# Patient Record
Sex: FEMALE | Race: BLACK OR AFRICAN AMERICAN | NOT HISPANIC OR LATINO | URBAN - METROPOLITAN AREA
[De-identification: names, ages, dates, MRNs, and addresses within clinical notes are randomized per-mention and may not be internally consistent; named-entity substitution may affect disease eponyms.]

---

## 2020-09-26 ENCOUNTER — EMERGENCY (EMERGENCY)
Facility: HOSPITAL | Age: 80
LOS: 0 days | Discharge: HOME | End: 2020-09-26
Attending: EMERGENCY MEDICINE
Payer: MEDICAID

## 2020-09-26 VITALS
TEMPERATURE: 98 F | HEART RATE: 71 BPM | OXYGEN SATURATION: 100 % | RESPIRATION RATE: 17 BRPM | SYSTOLIC BLOOD PRESSURE: 127 MMHG | DIASTOLIC BLOOD PRESSURE: 78 MMHG

## 2020-09-26 VITALS
TEMPERATURE: 97 F | HEART RATE: 74 BPM | DIASTOLIC BLOOD PRESSURE: 80 MMHG | RESPIRATION RATE: 18 BRPM | SYSTOLIC BLOOD PRESSURE: 132 MMHG | OXYGEN SATURATION: 96 % | WEIGHT: 134.92 LBS | HEIGHT: 58 IN

## 2020-09-26 DIAGNOSIS — I10 ESSENTIAL (PRIMARY) HYPERTENSION: ICD-10-CM

## 2020-09-26 DIAGNOSIS — R60.9 EDEMA, UNSPECIFIED: ICD-10-CM

## 2020-09-26 DIAGNOSIS — R60.0 LOCALIZED EDEMA: ICD-10-CM

## 2020-09-26 LAB
ALBUMIN SERPL ELPH-MCNC: 4.3 G/DL — SIGNIFICANT CHANGE UP (ref 3.3–5)
ALP SERPL-CCNC: 74 U/L — SIGNIFICANT CHANGE UP (ref 40–120)
ALT FLD-CCNC: 22 U/L — SIGNIFICANT CHANGE UP (ref 12–78)
ANION GAP SERPL CALC-SCNC: 4 MMOL/L — LOW (ref 5–17)
APTT BLD: 27.5 SEC — SIGNIFICANT CHANGE UP (ref 27.5–35.5)
AST SERPL-CCNC: 33 U/L — SIGNIFICANT CHANGE UP (ref 15–37)
BASOPHILS # BLD AUTO: 0.03 K/UL — SIGNIFICANT CHANGE UP (ref 0–0.2)
BASOPHILS NFR BLD AUTO: 0.6 % — SIGNIFICANT CHANGE UP (ref 0–2)
BILIRUB SERPL-MCNC: 0.6 MG/DL — SIGNIFICANT CHANGE UP (ref 0.2–1.2)
BUN SERPL-MCNC: 8 MG/DL — SIGNIFICANT CHANGE UP (ref 7–23)
CALCIUM SERPL-MCNC: 9.8 MG/DL — SIGNIFICANT CHANGE UP (ref 8.5–10.1)
CHLORIDE SERPL-SCNC: 108 MMOL/L — SIGNIFICANT CHANGE UP (ref 96–108)
CO2 SERPL-SCNC: 30 MMOL/L — SIGNIFICANT CHANGE UP (ref 22–31)
CREAT SERPL-MCNC: 0.76 MG/DL — SIGNIFICANT CHANGE UP (ref 0.5–1.3)
EOSINOPHIL # BLD AUTO: 0.01 K/UL — SIGNIFICANT CHANGE UP (ref 0–0.5)
EOSINOPHIL NFR BLD AUTO: 0.2 % — SIGNIFICANT CHANGE UP (ref 0–6)
GLUCOSE SERPL-MCNC: 93 MG/DL — SIGNIFICANT CHANGE UP (ref 70–99)
HCT VFR BLD CALC: 31.4 % — LOW (ref 34.5–45)
HGB BLD-MCNC: 10.3 G/DL — LOW (ref 11.5–15.5)
IMM GRANULOCYTES NFR BLD AUTO: 0.4 % — SIGNIFICANT CHANGE UP (ref 0–1.5)
INR BLD: 1.06 RATIO — SIGNIFICANT CHANGE UP (ref 0.88–1.16)
LYMPHOCYTES # BLD AUTO: 1.94 K/UL — SIGNIFICANT CHANGE UP (ref 1–3.3)
LYMPHOCYTES # BLD AUTO: 35.6 % — SIGNIFICANT CHANGE UP (ref 13–44)
MCHC RBC-ENTMCNC: 30.6 PG — SIGNIFICANT CHANGE UP (ref 27–34)
MCHC RBC-ENTMCNC: 32.8 GM/DL — SIGNIFICANT CHANGE UP (ref 32–36)
MCV RBC AUTO: 93.2 FL — SIGNIFICANT CHANGE UP (ref 80–100)
MONOCYTES # BLD AUTO: 0.4 K/UL — SIGNIFICANT CHANGE UP (ref 0–0.9)
MONOCYTES NFR BLD AUTO: 7.3 % — SIGNIFICANT CHANGE UP (ref 2–14)
NEUTROPHILS # BLD AUTO: 3.05 K/UL — SIGNIFICANT CHANGE UP (ref 1.8–7.4)
NEUTROPHILS NFR BLD AUTO: 55.9 % — SIGNIFICANT CHANGE UP (ref 43–77)
NRBC # BLD: 0 /100 WBCS — SIGNIFICANT CHANGE UP (ref 0–0)
NT-PROBNP SERPL-SCNC: 47 PG/ML — SIGNIFICANT CHANGE UP (ref 0–450)
PLATELET # BLD AUTO: 159 K/UL — SIGNIFICANT CHANGE UP (ref 150–400)
POTASSIUM SERPL-MCNC: 4 MMOL/L — SIGNIFICANT CHANGE UP (ref 3.5–5.3)
POTASSIUM SERPL-SCNC: 4 MMOL/L — SIGNIFICANT CHANGE UP (ref 3.5–5.3)
PROT SERPL-MCNC: 8.5 GM/DL — HIGH (ref 6–8.3)
PROTHROM AB SERPL-ACNC: 12.3 SEC — SIGNIFICANT CHANGE UP (ref 10.6–13.6)
RBC # BLD: 3.37 M/UL — LOW (ref 3.8–5.2)
RBC # FLD: 12.6 % — SIGNIFICANT CHANGE UP (ref 10.3–14.5)
SODIUM SERPL-SCNC: 142 MMOL/L — SIGNIFICANT CHANGE UP (ref 135–145)
WBC # BLD: 5.45 K/UL — SIGNIFICANT CHANGE UP (ref 3.8–10.5)
WBC # FLD AUTO: 5.45 K/UL — SIGNIFICANT CHANGE UP (ref 3.8–10.5)

## 2020-09-26 PROCEDURE — 99285 EMERGENCY DEPT VISIT HI MDM: CPT

## 2020-09-26 PROCEDURE — 93970 EXTREMITY STUDY: CPT | Mod: 26

## 2020-09-26 NOTE — ED ADULT NURSE NOTE - NSFALLRSKOUTCOME_ED_ALL_ED
Bed: P6  Expected date: 7/13/19  Expected time: 5:40 PM  Means of arrival: [facility] (127)  Comments:  34 yr F Right sided abdominal pain. Onset: End of June.  Was diagnosed with gallstones.  Apt with MD on Monday but unable to wait.      172/106 95 16R 99%RA   Universal Safety Interventions

## 2020-09-26 NOTE — ED PROVIDER NOTE - PHYSICAL EXAMINATION
Gen: Alert, Well appearing. NAD    Head: NC, AT, PERRL, normal lids/conjunctiva   ENT: Bilateral TM WNL, patent oropharynx without erythema/exudate, uvula midline  Neck: supple, no tenderness/meningismus  Pulm: Bilateral clear BS, normal resp effort  CV: RRR, no M/R/G, +dist pulses   Abd: soft, NT/ND, +BS, no guarding/rebound tenderness  Mskel: ++edema to left knee, mild edema to rt leg. no tenderness, erythema  Skin: no rash, no bruising  Neuro: AAOx3, no sensory/motor deficits, CN 2-12 intact

## 2020-09-26 NOTE — ED PROVIDER NOTE - NSFOLLOWUPINSTRUCTIONS_ED_ALL_ED_FT
Follow up with your primary care doctor within the next 24-48 hours and bring copy of your results.  Return to the Emergency Department for worsening or persistent symptoms or any other concerns incl. chest pain, shortness of breath, dizziness, inability to tolerate oral intake.  Rest, drink plenty of fluids.  Advance activity as tolerated.  Continue all previously prescribed medications as directed.     Elevate your legs as often as possible.

## 2020-09-26 NOTE — ED ADULT NURSE NOTE - OBJECTIVE STATEMENT
81 y/o female with PMH of HTN & Arthritis. Presents to the ED with c/c of right lower extremity swelling. pt has bilateral leg swelling and the right leg has gotten worst over the past couple days. There's pitting edema with 1+pulse on the dorsal right foot & intermittent pain upon palpitation.

## 2020-09-26 NOTE — ED PROVIDER NOTE - PATIENT PORTAL LINK FT
You can access the FollowMyHealth Patient Portal offered by [facility] by registering at the following website: [email or web address]. By joining [email or web address] FollowMyHealth portal, you will also be able to view your health information using other applications (apps) compatible with our system.

## 2020-09-26 NOTE — ED PROVIDER NOTE - OBJECTIVE STATEMENT
79yo female with pmh HTN presents with LE edema for a while, but now left more so in past few days. denies cp, sob, dizziness, palpitations. seen by PMD yesterday told to go to ER for sono yesterday. denies h/o dvt/pe, gi bleed, frequent falls.    No fever/chills, No photophobia/eye pain/changes in vision, No ear pain/sore throat/dysphagia, No chest pain/palpitations, no SOB/cough, no wheeze/stridor, No abdominal pain, No N/V/D, no dysuria/frequency/discharge, No neck/back pain, + LE edema, no changes in neurological status/function.

## 2021-03-25 ENCOUNTER — INPATIENT (INPATIENT)
Facility: HOSPITAL | Age: 81
LOS: 11 days | Discharge: HOME | End: 2021-04-06
Attending: STUDENT IN AN ORGANIZED HEALTH CARE EDUCATION/TRAINING PROGRAM | Admitting: STUDENT IN AN ORGANIZED HEALTH CARE EDUCATION/TRAINING PROGRAM
Payer: MEDICAID

## 2021-03-25 VITALS
HEART RATE: 93 BPM | HEIGHT: 58 IN | DIASTOLIC BLOOD PRESSURE: 61 MMHG | RESPIRATION RATE: 21 BRPM | TEMPERATURE: 100 F | WEIGHT: 179.9 LBS | SYSTOLIC BLOOD PRESSURE: 110 MMHG | OXYGEN SATURATION: 93 %

## 2021-03-25 LAB
ALBUMIN SERPL ELPH-MCNC: 2.8 G/DL — LOW (ref 3.3–5)
ALBUMIN SERPL ELPH-MCNC: 3.1 G/DL — LOW (ref 3.3–5)
ALP SERPL-CCNC: 46 U/L — SIGNIFICANT CHANGE UP (ref 40–120)
ALP SERPL-CCNC: 48 U/L — SIGNIFICANT CHANGE UP (ref 40–120)
ALT FLD-CCNC: 47 U/L — SIGNIFICANT CHANGE UP (ref 12–78)
ALT FLD-CCNC: 55 U/L — SIGNIFICANT CHANGE UP (ref 12–78)
ANION GAP SERPL CALC-SCNC: 9 MMOL/L — SIGNIFICANT CHANGE UP (ref 5–17)
APPEARANCE UR: CLEAR — SIGNIFICANT CHANGE UP
APTT BLD: 25.8 SEC — LOW (ref 27.5–35.5)
AST SERPL-CCNC: 79 U/L — HIGH (ref 15–37)
AST SERPL-CCNC: 83 U/L — HIGH (ref 15–37)
BACTERIA # UR AUTO: ABNORMAL
BASOPHILS # BLD AUTO: 0.03 K/UL — SIGNIFICANT CHANGE UP (ref 0–0.2)
BASOPHILS NFR BLD AUTO: 0.3 % — SIGNIFICANT CHANGE UP (ref 0–2)
BILIRUB DIRECT SERPL-MCNC: 0.22 MG/DL — HIGH (ref 0.05–0.2)
BILIRUB INDIRECT FLD-MCNC: 0.5 MG/DL — SIGNIFICANT CHANGE UP (ref 0.2–1)
BILIRUB SERPL-MCNC: 0.7 MG/DL — SIGNIFICANT CHANGE UP (ref 0.2–1.2)
BILIRUB SERPL-MCNC: 0.7 MG/DL — SIGNIFICANT CHANGE UP (ref 0.2–1.2)
BILIRUB UR-MCNC: NEGATIVE — SIGNIFICANT CHANGE UP
BUN SERPL-MCNC: 12 MG/DL — SIGNIFICANT CHANGE UP (ref 7–23)
CALCIUM SERPL-MCNC: 8.8 MG/DL — SIGNIFICANT CHANGE UP (ref 8.5–10.1)
CHLORIDE SERPL-SCNC: 103 MMOL/L — SIGNIFICANT CHANGE UP (ref 96–108)
CK SERPL-CCNC: 104 U/L — SIGNIFICANT CHANGE UP (ref 26–192)
CO2 SERPL-SCNC: 27 MMOL/L — SIGNIFICANT CHANGE UP (ref 22–31)
COLOR SPEC: YELLOW — SIGNIFICANT CHANGE UP
CREAT SERPL-MCNC: 0.71 MG/DL — SIGNIFICANT CHANGE UP (ref 0.5–1.3)
CREAT SERPL-MCNC: 0.98 MG/DL — SIGNIFICANT CHANGE UP (ref 0.5–1.3)
D DIMER BLD IA.RAPID-MCNC: HIGH NG/ML DDU
DIFF PNL FLD: ABNORMAL
EOSINOPHIL # BLD AUTO: 0 K/UL — SIGNIFICANT CHANGE UP (ref 0–0.5)
EOSINOPHIL NFR BLD AUTO: 0 % — SIGNIFICANT CHANGE UP (ref 0–6)
EPI CELLS # UR: SIGNIFICANT CHANGE UP
FIBRINOGEN PPP-MCNC: 720 MG/DL — HIGH (ref 290–520)
FLUAV AG NPH QL: SIGNIFICANT CHANGE UP
FLUBV AG NPH QL: SIGNIFICANT CHANGE UP
GLUCOSE SERPL-MCNC: 129 MG/DL — HIGH (ref 70–99)
GLUCOSE UR QL: NEGATIVE MG/DL — SIGNIFICANT CHANGE UP
HCT VFR BLD CALC: 33.5 % — LOW (ref 34.5–45)
HGB BLD-MCNC: 11 G/DL — LOW (ref 11.5–15.5)
IMM GRANULOCYTES NFR BLD AUTO: 1.2 % — SIGNIFICANT CHANGE UP (ref 0–1.5)
INR BLD: 1.36 RATIO — HIGH (ref 0.88–1.16)
INR BLD: 1.43 RATIO — HIGH (ref 0.88–1.16)
KETONES UR-MCNC: NEGATIVE — SIGNIFICANT CHANGE UP
LACTATE SERPL-SCNC: 2.3 MMOL/L — HIGH (ref 0.7–2)
LEUKOCYTE ESTERASE UR-ACNC: ABNORMAL
LYMPHOCYTES # BLD AUTO: 0.83 K/UL — LOW (ref 1–3.3)
LYMPHOCYTES # BLD AUTO: 8 % — LOW (ref 13–44)
MCHC RBC-ENTMCNC: 30.1 PG — SIGNIFICANT CHANGE UP (ref 27–34)
MCHC RBC-ENTMCNC: 32.8 GM/DL — SIGNIFICANT CHANGE UP (ref 32–36)
MCV RBC AUTO: 91.5 FL — SIGNIFICANT CHANGE UP (ref 80–100)
MONOCYTES # BLD AUTO: 0.64 K/UL — SIGNIFICANT CHANGE UP (ref 0–0.9)
MONOCYTES NFR BLD AUTO: 6.2 % — SIGNIFICANT CHANGE UP (ref 2–14)
NEUTROPHILS # BLD AUTO: 8.72 K/UL — HIGH (ref 1.8–7.4)
NEUTROPHILS NFR BLD AUTO: 84.3 % — HIGH (ref 43–77)
NITRITE UR-MCNC: NEGATIVE — SIGNIFICANT CHANGE UP
NRBC # BLD: 0 /100 WBCS — SIGNIFICANT CHANGE UP (ref 0–0)
PH UR: 5 — SIGNIFICANT CHANGE UP (ref 5–8)
PLATELET # BLD AUTO: 197 K/UL — SIGNIFICANT CHANGE UP (ref 150–400)
POTASSIUM SERPL-MCNC: 3.7 MMOL/L — SIGNIFICANT CHANGE UP (ref 3.5–5.3)
POTASSIUM SERPL-SCNC: 3.7 MMOL/L — SIGNIFICANT CHANGE UP (ref 3.5–5.3)
PROT SERPL-MCNC: 6.6 GM/DL — SIGNIFICANT CHANGE UP (ref 6–8.3)
PROT SERPL-MCNC: 7.3 GM/DL — SIGNIFICANT CHANGE UP (ref 6–8.3)
PROT UR-MCNC: 100 MG/DL
PROTHROM AB SERPL-ACNC: 15.5 SEC — HIGH (ref 10.6–13.6)
PROTHROM AB SERPL-ACNC: 16.3 SEC — HIGH (ref 10.6–13.6)
RBC # BLD: 3.66 M/UL — LOW (ref 3.8–5.2)
RBC # FLD: 12.3 % — SIGNIFICANT CHANGE UP (ref 10.3–14.5)
RBC CASTS # UR COMP ASSIST: SIGNIFICANT CHANGE UP /HPF (ref 0–4)
SARS-COV-2 RNA SPEC QL NAA+PROBE: DETECTED
SODIUM SERPL-SCNC: 139 MMOL/L — SIGNIFICANT CHANGE UP (ref 135–145)
SP GR SPEC: 1.01 — SIGNIFICANT CHANGE UP (ref 1.01–1.02)
TROPONIN I SERPL-MCNC: 0.09 NG/ML — HIGH (ref 0.01–0.04)
TROPONIN I SERPL-MCNC: 0.11 NG/ML — HIGH (ref 0.01–0.04)
UROBILINOGEN FLD QL: NEGATIVE MG/DL — SIGNIFICANT CHANGE UP
WBC # BLD: 10.34 K/UL — SIGNIFICANT CHANGE UP (ref 3.8–10.5)
WBC # FLD AUTO: 10.34 K/UL — SIGNIFICANT CHANGE UP (ref 3.8–10.5)
WBC UR QL: SIGNIFICANT CHANGE UP

## 2021-03-25 PROCEDURE — 99285 EMERGENCY DEPT VISIT HI MDM: CPT

## 2021-03-25 PROCEDURE — 99223 1ST HOSP IP/OBS HIGH 75: CPT

## 2021-03-25 PROCEDURE — 71045 X-RAY EXAM CHEST 1 VIEW: CPT | Mod: 26

## 2021-03-25 PROCEDURE — 99497 ADVNCD CARE PLAN 30 MIN: CPT | Mod: 25

## 2021-03-25 PROCEDURE — 71275 CT ANGIOGRAPHY CHEST: CPT | Mod: 26

## 2021-03-25 PROCEDURE — 93010 ELECTROCARDIOGRAM REPORT: CPT

## 2021-03-25 RX ORDER — ACETAMINOPHEN 500 MG
650 TABLET ORAL ONCE
Refills: 0 | Status: COMPLETED | OUTPATIENT
Start: 2021-03-25 | End: 2021-03-25

## 2021-03-25 RX ORDER — ASCORBIC ACID 60 MG
1 TABLET,CHEWABLE ORAL
Qty: 0 | Refills: 0 | DISCHARGE

## 2021-03-25 RX ORDER — SODIUM CHLORIDE 9 MG/ML
1000 INJECTION INTRAMUSCULAR; INTRAVENOUS; SUBCUTANEOUS ONCE
Refills: 0 | Status: COMPLETED | OUTPATIENT
Start: 2021-03-25 | End: 2021-03-25

## 2021-03-25 RX ORDER — BENZOYL PEROXIDE MICRONIZED 5.8 %
1 TOWELETTE (EA) TOPICAL
Qty: 0 | Refills: 0 | DISCHARGE

## 2021-03-25 RX ORDER — TRIAMTERENE/HYDROCHLOROTHIAZID 75 MG-50MG
1 TABLET ORAL
Qty: 0 | Refills: 0 | DISCHARGE

## 2021-03-25 RX ORDER — REMDESIVIR 5 MG/ML
100 INJECTION INTRAVENOUS EVERY 24 HOURS
Refills: 0 | Status: COMPLETED | OUTPATIENT
Start: 2021-03-26 | End: 2021-03-29

## 2021-03-25 RX ORDER — ENOXAPARIN SODIUM 100 MG/ML
80 INJECTION SUBCUTANEOUS EVERY 12 HOURS
Refills: 0 | Status: DISCONTINUED | OUTPATIENT
Start: 2021-03-25 | End: 2021-03-27

## 2021-03-25 RX ORDER — ENOXAPARIN SODIUM 100 MG/ML
40 INJECTION SUBCUTANEOUS ONCE
Refills: 0 | Status: COMPLETED | OUTPATIENT
Start: 2021-03-25 | End: 2021-03-25

## 2021-03-25 RX ORDER — REMDESIVIR 5 MG/ML
100 INJECTION INTRAVENOUS EVERY 24 HOURS
Refills: 0 | Status: DISCONTINUED | OUTPATIENT
Start: 2021-03-25 | End: 2021-03-25

## 2021-03-25 RX ORDER — REMDESIVIR 5 MG/ML
INJECTION INTRAVENOUS
Refills: 0 | Status: COMPLETED | OUTPATIENT
Start: 2021-03-25 | End: 2021-03-29

## 2021-03-25 RX ORDER — ASPIRIN/CALCIUM CARB/MAGNESIUM 324 MG
162 TABLET ORAL ONCE
Refills: 0 | Status: COMPLETED | OUTPATIENT
Start: 2021-03-25 | End: 2021-03-25

## 2021-03-25 RX ORDER — TOCILIZUMAB 20 MG/ML
600 INJECTION, SOLUTION, CONCENTRATE INTRAVENOUS ONCE
Refills: 0 | Status: DISCONTINUED | OUTPATIENT
Start: 2021-03-25 | End: 2021-03-26

## 2021-03-25 RX ORDER — DEXAMETHASONE 0.5 MG/5ML
6 ELIXIR ORAL ONCE
Refills: 0 | Status: COMPLETED | OUTPATIENT
Start: 2021-03-25 | End: 2021-03-25

## 2021-03-25 RX ORDER — FUROSEMIDE 40 MG
1 TABLET ORAL
Qty: 0 | Refills: 0 | DISCHARGE

## 2021-03-25 RX ORDER — REMDESIVIR 5 MG/ML
200 INJECTION INTRAVENOUS EVERY 24 HOURS
Refills: 0 | Status: COMPLETED | OUTPATIENT
Start: 2021-03-25 | End: 2021-03-25

## 2021-03-25 RX ORDER — ENOXAPARIN SODIUM 100 MG/ML
40 INJECTION SUBCUTANEOUS EVERY 12 HOURS
Refills: 0 | Status: DISCONTINUED | OUTPATIENT
Start: 2021-03-25 | End: 2021-03-25

## 2021-03-25 RX ORDER — ASCORBIC ACID 60 MG
0 TABLET,CHEWABLE ORAL
Qty: 0 | Refills: 0 | DISCHARGE

## 2021-03-25 RX ORDER — DEXAMETHASONE 0.5 MG/5ML
6 ELIXIR ORAL DAILY
Refills: 0 | Status: DISCONTINUED | OUTPATIENT
Start: 2021-03-25 | End: 2021-03-30

## 2021-03-25 RX ADMIN — REMDESIVIR 500 MILLIGRAM(S): 5 INJECTION INTRAVENOUS at 19:00

## 2021-03-25 RX ADMIN — Medication 162 MILLIGRAM(S): at 17:23

## 2021-03-25 RX ADMIN — Medication 650 MILLIGRAM(S): at 17:23

## 2021-03-25 RX ADMIN — Medication 6 MILLIGRAM(S): at 17:23

## 2021-03-25 RX ADMIN — SODIUM CHLORIDE 1000 MILLILITER(S): 9 INJECTION INTRAMUSCULAR; INTRAVENOUS; SUBCUTANEOUS at 17:22

## 2021-03-25 RX ADMIN — ENOXAPARIN SODIUM 40 MILLIGRAM(S): 100 INJECTION SUBCUTANEOUS at 18:17

## 2021-03-25 RX ADMIN — Medication 6 MILLIGRAM(S): at 18:17

## 2021-03-25 NOTE — H&P ADULT - NSHPSOCIALHISTORY_GEN_ALL_CORE
lives with Family.   No illicit drug abuse, alcohol abuse, tobacco abuse.     PSH: none    FH: no hx of CAD. PE in Family members. Grand mother has HTN

## 2021-03-25 NOTE — ED ADULT NURSE NOTE - OBJECTIVE STATEMENT
Pt received in bed alert and oriented and resting in bed with the c/o being covid + x 2 week. Pt is week and hypoxic and on 6L NC. As per Md's orders IV yuri placed and blood specimen obtained and sent to the lab. Nursing care ongoing and safety maintained. Pt maintained of contact isolation for covid 19

## 2021-03-25 NOTE — H&P ADULT - ASSESSMENT
80 year old female with PMH of arthritis, HTN and chronic leg edema was presented with worsening shortness of breath. Patient was diagnosed with acute hypoxic respiratory failure and tested positive for COVID bilateral pneumonia.     Acute hypoxic respiratory failure with bilateral COVID pneumonia. other important differentials include superimposed bacterial pneumonia, PE (given elevated d-dimers) and CHF (cardiomegaly on CXR and hx of chronic leg edema). admit to tele. start remdesivir and decadron (some effect if given within 2 weeks window). Trend Hepatic panel, creatinine and inflammatory markers. cont high flow oxygen to keep sat > 92%. I called and discussed with Dr Cabello, pharmacy and Dr Judge ([phone]) about Toci>> cont decadron today and if no improvement in respiratory status by tomorrow, should get Toci. Follow pending CTA chest to rule out or in PE. start lovenox 40 mg q12h for now.   Normocytic anemia. no active gross bleeding. watch H and H on lovenox.   Hx of HTN. controlled at this time. I will hold off home med for now and monitor. Add iv hydralazine prn.   DVT ppx: Lovenox  Full code.  please separate note for GOC.

## 2021-03-25 NOTE — GOALS OF CARE CONVERSATION - ADVANCED CARE PLANNING - CONVERSATION DETAILS
discussed about goals of care and prognosis and also about health care proxy  patient named her daughter- in -law Yunior as her HCP for medical decisions.   She wanted to be full code and wants to pursue aggressive care.  I also called and discussed the same with Edgarma on phone

## 2021-03-25 NOTE — ED PROVIDER NOTE - CARE PLAN
Principal Discharge DX:	Shortness of breath  Secondary Diagnosis:	COVID-19  Secondary Diagnosis:	Hypoxia

## 2021-03-25 NOTE — H&P ADULT - NSHPLABSRESULTS_GEN_ALL_CORE
LABS:                        11.0   10.34 )-----------( 197      ( 25 Mar 2021 16:01 )             33.5             03-25    139  |  103  |  12  ----------------------------<  129<H>  3.7   |  27  |  0.98    Ca    8.8      25 Mar 2021 16:01    TPro  7.3  /  Alb  3.1<L>  /  TBili  0.7  /  DBili  x   /  AST  83<H>  /  ALT  55  /  AlkPhos  48  03-25              PT/INR - ( 25 Mar 2021 16:01 )   PT: 15.5 sec;   INR: 1.36 ratio         PTT - ( 25 Mar 2021 16:01 )  PTT:25.8 sec     CARDIAC MARKERS ( 25 Mar 2021 16:01 )  .085 ng/mL / x     / 104 U/L / x     / x        Interval Radiology studies: reviewed by me  < from: Xray Chest 1 View- PORTABLE-Urgent (03.25.21 @ 16:28) >      IMPRESSION: Heart enlargement and bilateral infiltrates.    < end of copied text >    US legs no DVT

## 2021-03-25 NOTE — H&P ADULT - NSHPPHYSICALEXAM_GEN_ALL_CORE
Vital Signs Last 24 Hrs  T(C): 37.8 (25 Mar 2021 15:03), Max: 37.8 (25 Mar 2021 15:03)  T(F): 100.1 (25 Mar 2021 15:03), Max: 100.1 (25 Mar 2021 15:03)  HR: 85 (25 Mar 2021 16:27) (85 - 93)  BP: 110/61 (25 Mar 2021 15:03) (110/61 - 110/61)  BP(mean): --  RR: 20 (25 Mar 2021 16:27) (20 - 21)  SpO2: 95% (25 Mar 2021 16:27) (93% - 95%)    General: elderly pleasant and on high flow oxygen  HEENT: Head is atraumatic and normocephalic. Pupils are equal and reactive to light. no Conjunctival congestion. Nasal mucosa was not examined as patient has high flow oxygen on. Oral mucosa moist. No Pharyngeal congestion. EAC intact and no drainage noticed from ears  Neck: supple. no JVD. No visible Thyroid enlargement   CVS: S1 S2 normal, RRR, no murmur  Resp: No labored breathing. Decreased air entry bibasally, no wheezing, no crackles   GI: abd soft, non tender. + BS  MSK: Expected ROM in all extremities. No cyanosis. No Clubbing. 1-2 + edema legs. no calf tenderness.   Neurology: Grossly non focal.   Lymphatic: No gross LAP   Integumentary: moist, no rash   Psychiatry: Alert, awake. Oriented x 3. Appropriate mood

## 2021-03-25 NOTE — ED PROVIDER NOTE - CLINICAL SUMMARY MEDICAL DECISION MAKING FREE TEXT BOX
79 yo F presenting with respiratory distress/hypoxia 2/2 covid pna. placed on HFNC(RR improved from 40's to 20's), + trop given ASA, + ddimer- ordered CTA, tba on tele.

## 2021-03-25 NOTE — H&P ADULT - NSHPREVIEWOFSYSTEMS_GEN_ALL_CORE
Except pertinent positives and negatives as mentioned in HPI, all other review of systems including constitutional, HEENT, CVS, Resp, GI, , MSK, Integumentary, Psychiatry, Neurology, allergic, hematologic/lymphatic are reviewed and found unremarkable at the time of my evaluation.

## 2021-03-25 NOTE — ED PROVIDER NOTE - OBJECTIVE STATEMENT
81 yo F, never smoker, hx HTN, presenting with complaints of SOB in setting of one week of COVID symptoms.+ cough, body aches. Denies cp. LLE swelling which patient states is chronic.

## 2021-03-25 NOTE — H&P ADULT - HISTORY OF PRESENT ILLNESS
80 year old female with PMH of arthritis, chronic leg edema and HTN was presented from PCP office via [facility] for worsening shortness of breath.   Patient stated that 2 weeks some of her Family members were diagnosed with COVID and she was tested positive on 3/13/21. She felt tired, had headache off and on with sore throat and cough.   she denied any sob at that time. For last 3 days, she reported developing progressive sob, worsened persistently. Associated fever.   sob worse on exertion. + chronic leg edema but no calf pain or redness.   no LOC or dizziness or chest pain.   no n/v/d/active gross bleeding.   Family members have recovered from COVID per patient.   No Medication intake for COVID at home.   I called daughter in law also and she mentioned to me that patient was likely sick during this time but patient downplayed her symptoms. patient's son told her to go to PCP otherwise he will call [facility] to take her to the hospital. Patient went to see her PCP and directed via [facility] to [facility].   in the ER, patient was found to have acute hypoxic respiratory failure and was placed on high flow oxygen.   CXR showed bilateral pneumonia consistent with COVID.   DVT was ruled out US doppler legs.   Hospitalist service was consulted for further management.

## 2021-03-25 NOTE — ED ADULT NURSE NOTE - NSIMPLEMENTINTERV_GEN_ALL_ED
Implemented All Universal Safety Interventions:  [place] to call system. Call bell, personal items and telephone within reach. Instruct patient to call for assistance. Room bathroom lighting operational. Non-slip footwear when patient is off stretcher. Physically safe environment: no spills, clutter or unnecessary equipment. Stretcher in lowest position, wheels locked, appropriate side rails in place.

## 2021-03-25 NOTE — ED PROVIDER NOTE - PHYSICAL EXAMINATION
VITALS: reviewed  GEN: NAD, A & O x 4  HEAD/EYES: NCAT, PERRL, EOMI, anicteric sclerae, no conjunctival pallor  ENT: mucus membranes moist, oropharynx WNL, trachea midline, no JVD  RESP: coarse bs b/l, tachypneic 30's-40's, saturating 87-88 off of O2, improved to mid 90's on 6L NC but still tachypneic, switched to HFNC and improved RR, chest wall nontender and atraumatic  CV: heart with reg rhythm S1, S2, no murmur; distal pulses intact and symmetric bilaterally  ABDOMEN:  nondistended, nontender, no palpable masses  : no CVAT  MSK: extremities atraumatic and nontender, no edema, no asymmetry. the back is without midline or lateral tenderness, there is no spinal deformity or stepoff and the back is ranged painlessly.   SKIN: warm, dry, no rash, no bruising, no cyanosis. color appropriate for ethnicity  NEURO: alert, mentating appropriately, no facial asymmetry.   PSYCH: Affect appropriate

## 2021-03-25 NOTE — ED PROVIDER NOTE - NS ED ROS FT
CONST: + fevers, no chills, no trauma  EYES: no pain, no visual disturbances  ENT: no sore throat, no epistaxis, no rhinorrhea, no hearing changes  CV: no chest pain, no palpitations, no orthopnea, no extremity pain or swelling  RESP: + shortness of breath, no cough, no sputum, no pleurisy, no wheezing  ABD: no abdominal pain, no nausea, no vomiting, no diarrhea, no black or bloody stool  : no dysuria, no hematuria, no frequency, no urgency  MSK: no back pain, no neck pain, no extremity pain  NEURO: no headache, no sensory disturbances, no focal weakness, no dizziness  HEME: no easy bleeding or bruising  SKIN: no diaphoresis, no rash

## 2021-03-26 LAB
24R-OH-CALCIDIOL SERPL-MCNC: 42.4 NG/ML — SIGNIFICANT CHANGE UP (ref 30–80)
ALBUMIN SERPL ELPH-MCNC: 2.7 G/DL — LOW (ref 3.3–5)
ALBUMIN SERPL ELPH-MCNC: 2.7 G/DL — LOW (ref 3.3–5)
ALP SERPL-CCNC: 48 U/L — SIGNIFICANT CHANGE UP (ref 40–120)
ALP SERPL-CCNC: 48 U/L — SIGNIFICANT CHANGE UP (ref 40–120)
ALT FLD-CCNC: 55 U/L — SIGNIFICANT CHANGE UP (ref 12–78)
ALT FLD-CCNC: 57 U/L — SIGNIFICANT CHANGE UP (ref 12–78)
ANION GAP SERPL CALC-SCNC: 8 MMOL/L — SIGNIFICANT CHANGE UP (ref 5–17)
AST SERPL-CCNC: 80 U/L — HIGH (ref 15–37)
AST SERPL-CCNC: 86 U/L — HIGH (ref 15–37)
BILIRUB DIRECT SERPL-MCNC: 0.14 MG/DL — SIGNIFICANT CHANGE UP (ref 0.05–0.2)
BILIRUB INDIRECT FLD-MCNC: 0.3 MG/DL — SIGNIFICANT CHANGE UP (ref 0.2–1)
BILIRUB SERPL-MCNC: 0.4 MG/DL — SIGNIFICANT CHANGE UP (ref 0.2–1.2)
BILIRUB SERPL-MCNC: 0.4 MG/DL — SIGNIFICANT CHANGE UP (ref 0.2–1.2)
BUN SERPL-MCNC: 10 MG/DL — SIGNIFICANT CHANGE UP (ref 7–23)
CALCIUM SERPL-MCNC: 8.7 MG/DL — SIGNIFICANT CHANGE UP (ref 8.5–10.1)
CHLORIDE SERPL-SCNC: 108 MMOL/L — SIGNIFICANT CHANGE UP (ref 96–108)
CO2 SERPL-SCNC: 26 MMOL/L — SIGNIFICANT CHANGE UP (ref 22–31)
COVID-19 SPIKE DOMAIN AB INTERP: POSITIVE
COVID-19 SPIKE DOMAIN ANTIBODY RESULT: 74.9 U/ML — HIGH
CREAT SERPL-MCNC: 0.66 MG/DL — SIGNIFICANT CHANGE UP (ref 0.5–1.3)
CREAT SERPL-MCNC: 0.68 MG/DL — SIGNIFICANT CHANGE UP (ref 0.5–1.3)
CRP SERPL-MCNC: 179 MG/L — HIGH
FERRITIN SERPL-MCNC: 3644 NG/ML — HIGH (ref 15–150)
GLUCOSE SERPL-MCNC: 125 MG/DL — HIGH (ref 70–99)
HCT VFR BLD CALC: 34.8 % — SIGNIFICANT CHANGE UP (ref 34.5–45)
HGB BLD-MCNC: 10.9 G/DL — LOW (ref 11.5–15.5)
INR BLD: 1.4 RATIO — HIGH (ref 0.88–1.16)
LDH SERPL L TO P-CCNC: 729 U/L — HIGH (ref 50–242)
MAGNESIUM SERPL-MCNC: 2.5 MG/DL — SIGNIFICANT CHANGE UP (ref 1.6–2.6)
MCHC RBC-ENTMCNC: 29.5 PG — SIGNIFICANT CHANGE UP (ref 27–34)
MCHC RBC-ENTMCNC: 31.3 GM/DL — LOW (ref 32–36)
MCV RBC AUTO: 94.1 FL — SIGNIFICANT CHANGE UP (ref 80–100)
NRBC # BLD: 0 /100 WBCS — SIGNIFICANT CHANGE UP (ref 0–0)
PHOSPHATE SERPL-MCNC: 3.7 MG/DL — SIGNIFICANT CHANGE UP (ref 2.5–4.5)
PLATELET # BLD AUTO: 209 K/UL — SIGNIFICANT CHANGE UP (ref 150–400)
POTASSIUM SERPL-MCNC: 3.6 MMOL/L — SIGNIFICANT CHANGE UP (ref 3.5–5.3)
POTASSIUM SERPL-SCNC: 3.6 MMOL/L — SIGNIFICANT CHANGE UP (ref 3.5–5.3)
PROCALCITONIN SERPL-MCNC: 0.11 NG/ML — HIGH (ref 0.02–0.1)
PROCALCITONIN SERPL-MCNC: 0.14 NG/ML — HIGH (ref 0.02–0.1)
PROCALCITONIN SERPL-MCNC: 0.15 NG/ML — HIGH (ref 0.02–0.1)
PROT SERPL-MCNC: 6.7 GM/DL — SIGNIFICANT CHANGE UP (ref 6–8.3)
PROT SERPL-MCNC: 6.9 GM/DL — SIGNIFICANT CHANGE UP (ref 6–8.3)
PROTHROM AB SERPL-ACNC: 16 SEC — HIGH (ref 10.6–13.6)
RBC # BLD: 3.7 M/UL — LOW (ref 3.8–5.2)
RBC # FLD: 12.4 % — SIGNIFICANT CHANGE UP (ref 10.3–14.5)
SARS-COV-2 IGG+IGM SERPL QL IA: 74.9 U/ML — HIGH
SARS-COV-2 IGG+IGM SERPL QL IA: POSITIVE
SODIUM SERPL-SCNC: 142 MMOL/L — SIGNIFICANT CHANGE UP (ref 135–145)
WBC # BLD: 8.98 K/UL — SIGNIFICANT CHANGE UP (ref 3.8–10.5)
WBC # FLD AUTO: 8.98 K/UL — SIGNIFICANT CHANGE UP (ref 3.8–10.5)

## 2021-03-26 PROCEDURE — 99233 SBSQ HOSP IP/OBS HIGH 50: CPT

## 2021-03-26 PROCEDURE — 93306 TTE W/DOPPLER COMPLETE: CPT | Mod: 26

## 2021-03-26 PROCEDURE — 99223 1ST HOSP IP/OBS HIGH 75: CPT

## 2021-03-26 RX ORDER — FUROSEMIDE 40 MG
20 TABLET ORAL DAILY
Refills: 0 | Status: DISCONTINUED | OUTPATIENT
Start: 2021-03-26 | End: 2021-04-06

## 2021-03-26 RX ADMIN — Medication 20 MILLIGRAM(S): at 18:56

## 2021-03-26 RX ADMIN — ENOXAPARIN SODIUM 80 MILLIGRAM(S): 100 INJECTION SUBCUTANEOUS at 18:54

## 2021-03-26 RX ADMIN — REMDESIVIR 500 MILLIGRAM(S): 5 INJECTION INTRAVENOUS at 18:54

## 2021-03-26 RX ADMIN — ENOXAPARIN SODIUM 40 MILLIGRAM(S): 100 INJECTION SUBCUTANEOUS at 00:01

## 2021-03-26 RX ADMIN — Medication 6 MILLIGRAM(S): at 05:13

## 2021-03-26 RX ADMIN — ENOXAPARIN SODIUM 80 MILLIGRAM(S): 100 INJECTION SUBCUTANEOUS at 05:13

## 2021-03-26 NOTE — CONSULT NOTE ADULT - ASSESSMENT
COVID-19 with pulmonary embolism  She does not merit tocilizumab at this point in time  Given rapid improvement in oxygenation, supect hypoxemia may have been more from PE than COVID-19  Patient is on AC    The clinical and experimental literature involving medications in SARS-CoV-2/COVID-19 evolves rapidly as we learn more about the virus.     A general COVID-19 severity of illness categorization (NIH.gov):  •Asymptomatic or Presymptomatic Infection: Individuals who test positive for SARS-CoV-2 by virologic testing using a molecular diagnostic (e.g., polymerase chain reaction) or antigen test, but have no symptoms.  •Mild Illness: Individuals who have any of the various signs and symptoms of COVID 19 (e.g., fever, cough, sore throat, malaise, headache, muscle pain) without shortness of breath, dyspnea, or abnormal chest imaging.  •Moderate Illness: Individuals who have evidence of lower respiratory disease by clinical assessment or imaging and a saturationof oxygen (SpO2) greater than or equal to 94% on room air.  •Severe Illness: Individuals who have respiratory frequency more than 30 breaths per minute, SpO2 less than 94% on room air,ratio of arterial partial pressure of oxygen to fraction of inspired oxygen (PaO2/FiO2) less than 300 mmHg, or lung infiltratesgreater than 50%.  •Critical Illness: Individuals who have respiratory failure, septic shock, and/or multiple organ dysfunction.    Patient's illness is characterized as severe    Prognostic factors associated with increased risk of mortality include age >50, Neutrophil:Lymphocyte ratio >5, Procalcitonin > 0.2, Ferritin >850, CRP >6, and D-Dimer >1000 (Jaylon et al, Lancet, Mach 2020). It should be said that the lab values in/of themselves are nonspecific in nature and can be abnormal for reasons other than SARS-CoV-2 infection. The likelihood of developing severe respiratory difficulty appears to increase in the second week after developing presenting symptoms.    Inflammatory markers are unfavorable    The use of doxycycline, hydroxychloroquine, azithromycin, ivermectin, or colchicine is not recommended.   The routine use of IL-1 and IL-6 inhibitors is not supported by presently available data. IL-6 inhibitors are potentially an option in very select circumstances.   The data regarding use of convalescent plasma is limited and outside of very limited circumstances insufficient to recommend its use.    Remdesivir has not consistently been shown to impact mortality. Thus far it has been shown to accomplish is decrease the length of hospitalization in patients with severe disease. In patient with moderate disease data showed clinical improvement in patient treated with 5 days of remdesivir, but not those treated for 10 days.    Criteria for use include:  • SpO2 < 94% on room air, OR requiring supplemental oxygen, OR requiring invasive mechanical ventilation, OR requiring ECMO (e.g moderate to critical disease)  • eGFR > 30 mL/min  • ALT < 5X ULN    Contraindications  • Use during pregnancy unless the potential benefits justify the potential risk for the mother and the fetus.  • Remdesivir should not be initiated in patients with ALT greater than or equal to 5 times the upper limit of normal (ULN) of baseline.  • Use in patients with renal impairment is based on potential risk/benefit considerations.  Remdesivir Dosing  • Adult patients greater than or equal to 40 k mG IV x 1 dose on day 1, followed by 100 mG IV q24h.  • Administration of Remdesivir in patients with eGFR less than 30 mL/min should be considered if the potential benefits outweigh the potential risks. There is a potential accumulation of cyclodextrin excipient found in Remdesivir.  Treatment Duration  • Mechanical ventilation and/or ECMO, duration is 10 days of treatment.  • Not requiring mechanical ventilation or ECMO, duration is 5 days of treatment.       If patient does not demonstrate clinical improvement, treatment may be extended for a total of 10 days if clinically indicated.  • Patients do not need to complete the course if they are stable for discharge.  Monitoring Parameters  • Daily renal and hepatic monitoring should be performed while on therapy       Discontinue therapy if patient is asymptomatic with ALT greater than or equal to 5 times the ULN, restart once ALT less than 5 times the ULN.       Discontinue therapy if ALT elevation is accompanied by signs or symptoms of liver inflammation or increasing conjugated bilirubin, alkaline phosphatase, or INR.  • Pregnancy Test  • Infusion-related reactions have been reported       Discontinue infusion and administer appropriate treatment.    In a large study, dexamethasone reduced  mortality reduced by 17% when adjusted for age/risk.    NIH recommendations on which patients should receive dexamethasone:  • In patients with severe COVID-19 who required oxygen support, the use of dexamethasone 6 mG daily for up to 10 days reduced mortality at 28 days in a preliminary analysis.  • The benefit of dexamethasone was most apparent in hospitalized patients who were mechanically ventilated. In other subgroup analysis the mortality reduction in ECMO or ventilator patients was 35% and in individuals requiring supplemental oxygen only mortality was decreased by 20%. However while those decreases are significant, it should be noted that mortality remained high in both groups (29% and 21.3%, respectively).   • There was no observed benefit of dexamethasone in patients who did not require oxygen support.  In subgroup analysis there was a trend toward higher mortality in patients who did not require oxygen or ventilatory support though it did not reach statistical signficance.     Monitoring, Adverse Effects, and Drug-Drug Interactions:  • Clinicians should closely monitor patients with COVID-19 who are receiving dexamethasone for adverse effects (e.g., hyperglycemia, secondary infections, psychiatric effects, avascular necrosis).  • Prolonged use of systemic corticosteroids may increase the risk of reactivation of latent infections (e.g., hepatitis B virus (HBV), herpesvirus infections, strongyloidiasis, tuberculosis).  • The risk of reactivation of latent infections for a 10-day course of dexamethasone (6 mG once daily) is not well-defined. When initiating dexamethasone, appropriate screening and treatment to reduce the risk of strongyloides superinfection in patients at high risk of strongyloidiasis (e.g. patients from tropical, subtropical, or warm, temperate regions or those engaged in agricultural activities) or fulminant reactivations of HBV should be considered.  • Dexamethasone should be continued for up to 10 days or until hospital discharge, whichever comes first.  Alternative medication:  • In case of dexamethasone shortage alternative medications may include methylprednisolone 32mG and prednisone 40mG.  Suggestions--  Continue remdesivir- 5 day course  Continye dexamethasone- 10 day course  Precautions per protocol, ideally airborne and contact in negative pressure room  Supplemental oxygen as required to maintain adequate saturation.  Avoid NIPPV, high flow O2, nebulizers or other interventions which may increase the likelihood of aerosolization as much as feasible  High threshold for antibiotic use  Vigilance for secondary infection and other superimposed events  Monitor inflammatory markers and lab data  A/C per primary team.  No role for tocilizumab with improvement  Thank you for the courtesy of this referral.    Frank Cabello MD  Attending Physician  [facility]  Division of Infectious Diseases  [phone]

## 2021-03-26 NOTE — PROGRESS NOTE ADULT - ASSESSMENT
80 year old female with PMH of arthritis, HTN and chronic leg edema was presented with worsening shortness of breath. Patient was diagnosed with acute hypoxic respiratory failure and tested positive for COVID bilateral pneumonia.     Acute hypoxic respiratory failure with bilateral COVID pneumonia and YENNY PE. Agree with lovenox full dose and monitor for any active bleeding. cont remdesivir and decadron. patient's oxygen saturation improved and now patient is requiring 6 liter oxygen. Toci was  approved but I cancelled it due to improvement in respiratory status on decadron and remdesivir. ID recs appreciated.   Normocytic anemia. no active gross bleeding. stable.  Hx of HTN. controlled at this time. I will hold off home med for now and monitor. Cont iv hydralazine prn.   Chronic leg edema bilaterally. start low dose lasix. monitor swelling.   DVT ppx: Lovenox  Full code.

## 2021-03-26 NOTE — CONSULT NOTE ADULT - SUBJECTIVE AND OBJECTIVE BOX
Full note to follow  Initially on HFNC but not on 6L NC with SaO2 96-98%  Was considered for, and deemed inappropriate for tocilizumab yesterday by by [facility] working group.  Suspect back pain may be due to her PE  Continue RDV/Dex  Precautions per protocol, ideally airborne and contact in negative pressure room  Supplemental oxygen as required to maintain adequate saturation.  Avoid NIPPV, high flow O2, nebulilzers, or other interventions which may increase the likelihood of aerosolization as much as feasible  High threshold for antibiotic use  Vigilance for secondary infection and other superimposed events  Monitor inflammatory markers and lab data  A/C per primary team.  No role for tocilizumab with improvement  Thank you for the courtesy of this referral.    Frank Cabello MD  Attending Physician  [facility]  Division of Infectious Diseases  [phone]    -------------------  [facility]  Division of Infectious Diseases  [phone]    DENTON JURADO  80y, Female  [id number]    HPI--      PMH/PSH--  Hypertension    Bilateral leg edema    Arthritis        Allergies--  No Known Allergies      Medications--  Antibiotics: remdesivir  IVPB   IV Intermittent   remdesivir  IVPB 100 milliGRAM(s) IV Intermittent every 24 hours    Immunologic:   Other: dexAMETHasone  Injectable  enoxaparin Injectable    Antimicrobials last 90 days per EMR: MEDICATIONS  (STANDING):    remdesivir  IVPB   500 mL/Hr IV Intermittent (03-25-21 @ 19:00)        Social History--  EtOH: denies   Tobacco: denies   Drug Use: denies     Family/Marital History--        Travel/Environmental/Occupational History:      Review of Systems:  A >=10-point review of systems was obtained.     Pertinent positives and negatives--  Constitutional: No fevers. No Chills. No Rigors.   Eyes:  ENMT:  Cardiovascular: No chest pain. No palpitations.  Respiratory: No shortness of breath. No cough.  Gastrointestinal: No nausea or vomiting. No diarrhea or constipation.   Genitourinary:  Musculoskeletal:  Skin:  Neurologic:  Psychiatric: Pleasant. Appropriate affect.  Endocrine:  Heme/Lymphatic:  Allergy/Immunologic:    Review of systems otherwise negative except as previously noted.    Physical Exam--  Vital Signs: T(F): 98.1 (03-26-21 @ 11:49), Max: 100.1 (03-25-21 @ 15:03)  HR: 63 (03-26-21 @ 11:49)  BP: 102/63 (03-26-21 @ 11:49)  RR: 18 (03-26-21 @ 12:30)  SpO2: 97% (03-26-21 @ 12:30)  Wt(kg): --  General: Nontoxic-appearing Female in no acute distress.  HEENT: AT/NC. PERRL. EOMI. Anicteric. Conjunctiva pink and moist. Oropharynx clear. Dentition fair.  Neck: Not rigid. No sense of mass.  Nodes: None palpable.  Lungs: Clear bilaterally without rales, wheezing or rhonchi  Heart: Regular rate and rhythm. No Murmur. No rub. No gallop. No palpable thrill.  Abdomen: Bowel sounds present and normoactive. Soft. Nondistended. Nontender. No sense of mass. No organomegaly.  Back: No spinal tenderness. No costovertebral angle tenderness.   Extremities: No cyanosis or clubbing. No edema.   Skin: Warm. Dry. Good turgor. No rash. No vasculitic stigmata.  Psychiatric: Appropriate affect and mood for situation.         Laboratory & Imaging Data--  CBC                        10.9   8.98  )-----------( 209      ( 26 Mar 2021 06:42 )             34.8       Chemistries  03-26    142  |  108  |  10  ----------------------------<  125<H>  3.6   |  26  |  0.66    Ca    8.7      26 Mar 2021 06:42  Phos  3.7     03-26  Mg     2.5     03-26    TPro  6.7  /  Alb  2.7<L>  /  TBili  0.4  /  DBili  .14  /  AST  86<H>  /  ALT  57  /  AlkPhos  48  03-26    COVID-related labs  Neutrophil and Lymphocyte count (NLR <3 vs. >5, better vs. worse prognosis)  Auto Neutrophil #: 8.72 K/uL (03-25-21 @ 16:01)  Auto Lymphocyte #: 0.83 K/uL (03-25-21 @ 16:01)      Procalcitonin (<0.2, worse prognosis)  0.14 ng/mL (03-26-21 @ 12:21)  0.11 ng/mL (03-26-21 @ 03:57)  0.15 ng/mL (03-26-21 @ 03:13)      Ferritin (<450 vs. >850, better vs. worse prognosis)  3644 ng/mL (03-26-21 @ 03:57)      CRP (<20 mg/dL vs. >60 mg/dL , better vs. worse prognosis)  179 mg/L (03-26-21 @ 03:57)      D-dimer (<1000 vs. > 1000, better vs. worse prognosis)  12567 ng/mL DDU (03-25-21 @ 16:01)      Creatinine/estGFR  Creatinine, Serum: 0.66 mg/dL (03-26-21 @ 06:42)  eGFR if Non African American: 85 mL/min/1.73M2 (03-26-21 @ 06:42)  eGFR if : 98 mL/min/1.73M2 (03-26-21 @ 06:42)  Creatinine, Serum: 0.68 mg/dL (03-26-21 @ 06:42)  eGFR if Non African American: 84 mL/min/1.73M2 (03-26-21 @ 06:42)  eGFR if : 97 mL/min/1.73M2 (03-26-21 @ 06:42)  Creatinine, Serum: 0.71 mg/dL (03-25-21 @ 19:18)  eGFR if Non African American: 82 mL/min/1.73M2 (03-25-21 @ 19:18)  eGFR if African American: 95 mL/min/1.73M2 (03-25-21 @ 19:18)  Creatinine, Serum: 0.98 mg/dL (03-25-21 @ 16:01)  eGFR if Non African American: 55 mL/min/1.73M2 (03-25-21 @ 16:01)  eGFR if : 64 mL/min/1.73M2 (03-25-21 @ 16:01)      ALT  57 U/L (03-26-21 @ 06:42)  55 U/L (03-26-21 @ 06:42)  47 U/L (03-25-21 @ 19:18)  55 U/L (03-25-21 @ 16:01)      Culture Data      < from: CT Angio Chest w/ IV Cont (03.25.21 @ 17:44) >    EXAM:  CT ANGIO CHEST (W)AW IC                            PROCEDURE DATE:  03/25/2021          INTERPRETATION:  CLINICAL INFORMATION: Hypoxia. COVID- 19 positive.    COMPARISON: None.    CONTRAST/COMPLICATIONS:  IV Contrast: Omnipaque 350  90 cc administered  Oral Contrast: NONE  Complications: None reported at time of study completion    PROCEDURE:  CT Angiography of the Chest.  Sagittal and coronal reformats were performed as well as 3D (MIP) reconstructions.    FINDINGS:    LUNGS AND AIRWAYS: Patent central airways.  Multifocal confluent groundglass infiltrates as well as bilateral lower lobe consolidations, right greater than left.  PLEURA: No pleural effusion.  MEDIASTINUM AND CONNIE: No lymphadenopathy.  VESSELS: There is respiratory motion artifact which limits evaluation slightly. There are filling defects in left upper lobe segmental branches.  HEART: Heart size is normal. No pericardial effusion.  CHEST WALL AND LOWER NECK: Within normal limits.  VISUALIZED UPPER ABDOMEN: Partially imaged left renal cyst.  BONES: Mild dextroscoliosis of the spine.    IMPRESSION:  Left upper lobe segmental pulmonary emboli..    Multifocal infiltrates in keeping with patient's history of COVID- 19 positive.    Findings were discussed with Dr. GWENDOLYN CABRERA [phone] 3/25/2021 7:05 PM by Dr. Cabrera with read back confirmation.            ROQUE DE LA CRUZ MD; Attending Radiologist  This document has been electronically signed. Mar 25 2021  7:09PM    < end of copied text >   Full note to follow  Initially on HFNC but not on 6L NC with SaO2 96-98%  Was considered for, and deemed inappropriate for tocilizumab yesterday by by [facility] working group.  Suspect back pain may be due to her PE  Continue RDV/Dex  Precautions per protocol, ideally airborne and contact in negative pressure room  Supplemental oxygen as required to maintain adequate saturation.  Avoid NIPPV, high flow O2, nebulilzers, or other interventions which may increase the likelihood of aerosolization as much as feasible  High threshold for antibiotic use  Vigilance for secondary infection and other superimposed events  Monitor inflammatory markers and lab data  A/C per primary team.  No role for tocilizumab with improvement  Thank you for the courtesy of this referral.    Frank Cabello MD  Attending Physician  [facility]  Division of Infectious Diseases  [phone]    -------------------  [facility]  Division of Infectious Diseases  [phone]    DENTON JURADO  80y, Female  [id number]    HPI--  80F with LE edema, HTN, staes she developed SOB and cough. Patient was doing ok but then acutely worsened and came to her ER. Things she got it from [facility] who slept over and then was diagnosed as COVID+. + fevers, no chills. Denies rigors. Decreased PO intake. Denies diarrhea.     CT chest here notable for changes c/w pneumonia as well as pulmonary embolism. LE duplex US was negative.     Here patient noted to be hypoxic and was rapidly escalated to HFNC. Call was made for tocilizumab but had not been given any steroids to that point in time. This am O2 reduced to 6L NC and patient maintaining saturations of 96-98%    Patient's only other complain is back pain which is sharp to burning in character L>R.     PMH/PSH--  Hypertension    Bilateral leg edema    Arthritis        Allergies--  No Known Allergies      Medications--  Antibiotics: remdesivir  IVPB   IV Intermittent   remdesivir  IVPB 100 milliGRAM(s) IV Intermittent every 24 hours    Immunologic:   Other: dexAMETHasone  Injectable  enoxaparin Injectable    Antimicrobials last 90 days per EMR: MEDICATIONS  (STANDING):    remdesivir  IVPB   500 mL/Hr IV Intermittent (03-25-21 @ 19:00)        Social History--  EtOH: denies   Tobacco: denies   Drug Use: denies     Family/Marital History--  Relevant as above, otherwise not relevant to clinical concern      Travel/Environmental/Occupational History:  No travel    Review of Systems:  A >=10-point review of systems was obtained.   Review of systems otherwise negative/noncontributory except as previously noted.    Physical Exam--  Vital Signs: T(F): 98.1 (03-26-21 @ 11:49), Max: 100.1 (03-25-21 @ 15:03)  HR: 63 (03-26-21 @ 11:49)  BP: 102/63 (03-26-21 @ 11:49)  RR: 18 (03-26-21 @ 12:30)  SpO2: 97% (03-26-21 @ 12:30)  Wt(kg): --  General: Frail but nontoxic-appearing Female in no acute distress.  HEENT: AT/NC. Anicteric. Conjunctiva pink and moist. Oropharynx clear.  Neck: Not rigid. No sense of mass.  Nodes: None palpable.  Lungs: Diminished breath sounds bilaterally without rales, wheezing or rhonchi  Heart: Regular rate and rhythm. No Murmur. No rub. No gallop. No palpable thrill.  Abdomen: Bowel sounds present and normoactive. Soft. Nondistended. Nontender. No sense of mass. No organomegaly.  Back: No spinal tenderness. No costovertebral angle tenderness.   Extremities: No cyanosis or clubbing. No edema.   Skin: Warm. Dry. Good turgor. No rash. No vasculitic stigmata.  Psychiatric: Appropriate affect and mood for situation.         Laboratory & Imaging Data--  CBC                        10.9   8.98  )-----------( 209      ( 26 Mar 2021 06:42 )             34.8       Chemistries  03-26    142  |  108  |  10  ----------------------------<  125<H>  3.6   |  26  |  0.66    Ca    8.7      26 Mar 2021 06:42  Phos  3.7     03-26  Mg     2.5     03-26    TPro  6.7  /  Alb  2.7<L>  /  TBili  0.4  /  DBili  .14  /  AST  86<H>  /  ALT  57  /  AlkPhos  48  03-26    COVID-related labs  Neutrophil and Lymphocyte count (NLR <3 vs. >5, better vs. worse prognosis)  Auto Neutrophil #: 8.72 K/uL (03-25-21 @ 16:01)  Auto Lymphocyte #: 0.83 K/uL (03-25-21 @ 16:01)      Procalcitonin (<0.2, worse prognosis)  0.14 ng/mL (03-26-21 @ 12:21)  0.11 ng/mL (03-26-21 @ 03:57)  0.15 ng/mL (03-26-21 @ 03:13)      Ferritin (<450 vs. >850, better vs. worse prognosis)  3644 ng/mL (03-26-21 @ 03:57)      CRP (<20 mg/dL vs. >60 mg/dL , better vs. worse prognosis)  179 mg/L (03-26-21 @ 03:57)      D-dimer (<1000 vs. > 1000, better vs. worse prognosis)  45262 ng/mL DDU (03-25-21 @ 16:01)      Creatinine/estGFR  Creatinine, Serum: 0.66 mg/dL (03-26-21 @ 06:42)  eGFR if Non African American: 85 mL/min/1.73M2 (03-26-21 @ 06:42)  eGFR if : 98 mL/min/1.73M2 (03-26-21 @ 06:42)  Creatinine, Serum: 0.68 mg/dL (03-26-21 @ 06:42)  eGFR if Non African American: 84 mL/min/1.73M2 (03-26-21 @ 06:42)  eGFR if : 97 mL/min/1.73M2 (03-26-21 @ 06:42)  Creatinine, Serum: 0.71 mg/dL (03-25-21 @ 19:18)  eGFR if Non African American: 82 mL/min/1.73M2 (03-25-21 @ 19:18)  eGFR if African American: 95 mL/min/1.73M2 (03-25-21 @ 19:18)  Creatinine, Serum: 0.98 mg/dL (03-25-21 @ 16:01)  eGFR if Non African American: 55 mL/min/1.73M2 (03-25-21 @ 16:01)  eGFR if : 64 mL/min/1.73M2 (03-25-21 @ 16:01)      ALT  57 U/L (03-26-21 @ 06:42)  55 U/L (03-26-21 @ 06:42)  47 U/L (03-25-21 @ 19:18)  55 U/L (03-25-21 @ 16:01)      Culture Data      < from: CT Angio Chest w/ IV Cont (03.25.21 @ 17:44) >  EXAM:  CT ANGIO CHEST (W)AW IC                        PROCEDURE DATE:  03/25/2021    INTERPRETATION:  CLINICAL INFORMATION: Hypoxia. COVID- 19 positive.    COMPARISON: None.    CONTRAST/COMPLICATIONS:  IV Contrast: Omnipaque 350  90 cc administered  Oral Contrast: NONE  Complications: None reported at time of study completion    PROCEDURE:  CT Angiography of the Chest.  Sagittal and coronal reformats were performed as well as 3D (MIP) reconstructions.    FINDINGS:    LUNGS AND AIRWAYS: Patent central airways.  Multifocal confluent groundglass infiltrates as well as bilateral lower lobe consolidations, right greater than left.  PLEURA: No pleural effusion.  MEDIASTINUM AND CONNIE: No lymphadenopathy.  VESSELS: There is respiratory motion artifact which limits evaluation slightly. There are filling defects in left upper lobe segmental branches.  HEART: Heart size is normal. No pericardial effusion.  CHEST WALL AND LOWER NECK: Within normal limits.  VISUALIZED UPPER ABDOMEN: Partially imaged left renal cyst.  BONES: Mild dextroscoliosis of the spine.    IMPRESSION:  Left upper lobe segmental pulmonary emboli..    Multifocal infiltrates in keeping with patient's history of COVID- 19 positive.    Findings were discussed with Dr. GWENDOLYN CABRERA [phone] 3/25/2021 7:05 PM by Dr. Cabrera with read back confirmation.            ROQUE DE LA CRUZ MD; Attending Radiologist  This document has been electronically signed. Mar 25 2021  7:09PM    < end of copied text >

## 2021-03-27 LAB
ALBUMIN SERPL ELPH-MCNC: 2.6 G/DL — LOW (ref 3.3–5)
ALP SERPL-CCNC: 77 U/L — SIGNIFICANT CHANGE UP (ref 40–120)
ALT FLD-CCNC: 104 U/L — HIGH (ref 12–78)
AST SERPL-CCNC: 114 U/L — HIGH (ref 15–37)
BILIRUB DIRECT SERPL-MCNC: 0.14 MG/DL — SIGNIFICANT CHANGE UP (ref 0.05–0.2)
BILIRUB INDIRECT FLD-MCNC: 0.4 MG/DL — SIGNIFICANT CHANGE UP (ref 0.2–1)
BILIRUB SERPL-MCNC: 0.5 MG/DL — SIGNIFICANT CHANGE UP (ref 0.2–1.2)
CREAT SERPL-MCNC: 0.75 MG/DL — SIGNIFICANT CHANGE UP (ref 0.5–1.3)
CULTURE RESULTS: SIGNIFICANT CHANGE UP
INR BLD: 1.37 RATIO — HIGH (ref 0.88–1.16)
PROT SERPL-MCNC: 6.7 GM/DL — SIGNIFICANT CHANGE UP (ref 6–8.3)
PROTHROM AB SERPL-ACNC: 15.7 SEC — HIGH (ref 10.6–13.6)
SPECIMEN SOURCE: SIGNIFICANT CHANGE UP

## 2021-03-27 PROCEDURE — 99232 SBSQ HOSP IP/OBS MODERATE 35: CPT

## 2021-03-27 RX ORDER — APIXABAN 2.5 MG/1
10 TABLET, FILM COATED ORAL EVERY 12 HOURS
Refills: 0 | Status: COMPLETED | OUTPATIENT
Start: 2021-03-28 | End: 2021-04-02

## 2021-03-27 RX ORDER — APIXABAN 2.5 MG/1
10 TABLET, FILM COATED ORAL ONCE
Refills: 0 | Status: COMPLETED | OUTPATIENT
Start: 2021-03-27 | End: 2021-03-27

## 2021-03-27 RX ORDER — ZOLPIDEM TARTRATE 10 MG/1
5 TABLET ORAL AT BEDTIME
Refills: 0 | Status: DISCONTINUED | OUTPATIENT
Start: 2021-03-27 | End: 2021-04-02

## 2021-03-27 RX ORDER — APIXABAN 2.5 MG/1
10 TABLET, FILM COATED ORAL EVERY 12 HOURS
Refills: 0 | Status: DISCONTINUED | OUTPATIENT
Start: 2021-03-27 | End: 2021-03-27

## 2021-03-27 RX ADMIN — REMDESIVIR 500 MILLIGRAM(S): 5 INJECTION INTRAVENOUS at 19:46

## 2021-03-27 RX ADMIN — ENOXAPARIN SODIUM 80 MILLIGRAM(S): 100 INJECTION SUBCUTANEOUS at 06:19

## 2021-03-27 RX ADMIN — ZOLPIDEM TARTRATE 5 MILLIGRAM(S): 10 TABLET ORAL at 20:48

## 2021-03-27 RX ADMIN — Medication 6 MILLIGRAM(S): at 06:19

## 2021-03-27 RX ADMIN — Medication 20 MILLIGRAM(S): at 06:18

## 2021-03-27 RX ADMIN — APIXABAN 10 MILLIGRAM(S): 2.5 TABLET, FILM COATED ORAL at 17:18

## 2021-03-27 NOTE — PROGRESS NOTE ADULT - ASSESSMENT
80 year old female with PMH of arthritis, HTN and chronic leg edema was presented with worsening shortness of breath. Patient was diagnosed with acute hypoxic respiratory failure and tested positive for COVID bilateral pneumonia.     Acute hypoxic respiratory failure with bilateral COVID pneumonia and YENNY PE. Change lovenox to eliquis. cont remdesivir and decadron. oxygen taper if possible. discussed with nurse and patient.   Normocytic anemia. no active gross bleeding. stable.  Hx of HTN. controlled at this time. I will hold off home med for now and monitor. Cont iv hydralazine prn.   Chronic leg edema bilaterally. Cont low dose lasix. monitor swelling.  Insomnia. start ambien. fall precautions.    DVT ppx: Eliquis  Full code.

## 2021-03-28 LAB
ALBUMIN SERPL ELPH-MCNC: 2.4 G/DL — LOW (ref 3.3–5)
ALP SERPL-CCNC: 72 U/L — SIGNIFICANT CHANGE UP (ref 40–120)
ALT FLD-CCNC: 77 U/L — SIGNIFICANT CHANGE UP (ref 12–78)
AST SERPL-CCNC: 61 U/L — HIGH (ref 15–37)
BILIRUB DIRECT SERPL-MCNC: 0.13 MG/DL — SIGNIFICANT CHANGE UP (ref 0.05–0.2)
BILIRUB INDIRECT FLD-MCNC: 0.4 MG/DL — SIGNIFICANT CHANGE UP (ref 0.2–1)
BILIRUB SERPL-MCNC: 0.5 MG/DL — SIGNIFICANT CHANGE UP (ref 0.2–1.2)
CREAT SERPL-MCNC: 0.69 MG/DL — SIGNIFICANT CHANGE UP (ref 0.5–1.3)
INR BLD: 2.35 RATIO — HIGH (ref 0.88–1.16)
PROT SERPL-MCNC: 6.3 GM/DL — SIGNIFICANT CHANGE UP (ref 6–8.3)
PROTHROM AB SERPL-ACNC: 26.2 SEC — HIGH (ref 10.6–13.6)

## 2021-03-28 PROCEDURE — 99233 SBSQ HOSP IP/OBS HIGH 50: CPT

## 2021-03-28 RX ORDER — ACETAMINOPHEN 500 MG
650 TABLET ORAL EVERY 6 HOURS
Refills: 0 | Status: DISCONTINUED | OUTPATIENT
Start: 2021-03-28 | End: 2021-04-06

## 2021-03-28 RX ADMIN — ZOLPIDEM TARTRATE 5 MILLIGRAM(S): 10 TABLET ORAL at 21:19

## 2021-03-28 RX ADMIN — Medication 650 MILLIGRAM(S): at 12:00

## 2021-03-28 RX ADMIN — Medication 20 MILLIGRAM(S): at 11:22

## 2021-03-28 RX ADMIN — Medication 650 MILLIGRAM(S): at 11:22

## 2021-03-28 RX ADMIN — Medication 6 MILLIGRAM(S): at 05:37

## 2021-03-28 RX ADMIN — REMDESIVIR 500 MILLIGRAM(S): 5 INJECTION INTRAVENOUS at 18:53

## 2021-03-28 RX ADMIN — APIXABAN 10 MILLIGRAM(S): 2.5 TABLET, FILM COATED ORAL at 17:04

## 2021-03-28 RX ADMIN — APIXABAN 10 MILLIGRAM(S): 2.5 TABLET, FILM COATED ORAL at 05:37

## 2021-03-28 NOTE — PROGRESS NOTE ADULT - ASSESSMENT
80 year old female with PMH of arthritis, HTN and chronic leg edema was presented with worsening shortness of breath. Patient was diagnosed with acute hypoxic respiratory failure and tested positive for COVID bilateral pneumonia.     Acute hypoxic respiratory failure with bilateral COVID pneumonia and YENNY PE. worsened hypoxia last night.  Cont eliquis. INR > 2. no active gross bleeding. cont remdesivir and decadron. Discussed with RT to taper down oxygen if possible. check inflammatory markers.   Normocytic anemia. no active gross bleeding. stable.  Hx of HTN. controlled at this time. I will hold off home med for now and monitor. Cont iv hydralazine prn.   Chronic leg edema bilaterally. Cont low dose lasix. monitor swelling.  Insomnia. Cont ambien. fall precautions.    DVT ppx: Eliquis  Full code.

## 2021-03-29 LAB
ALBUMIN SERPL ELPH-MCNC: 2.2 G/DL — LOW (ref 3.3–5)
ALP SERPL-CCNC: 75 U/L — SIGNIFICANT CHANGE UP (ref 40–120)
ALT FLD-CCNC: 72 U/L — SIGNIFICANT CHANGE UP (ref 12–78)
AST SERPL-CCNC: 66 U/L — HIGH (ref 15–37)
BILIRUB DIRECT SERPL-MCNC: 0.13 MG/DL — SIGNIFICANT CHANGE UP (ref 0.05–0.2)
BILIRUB INDIRECT FLD-MCNC: 0.2 MG/DL — SIGNIFICANT CHANGE UP (ref 0.2–1)
BILIRUB SERPL-MCNC: 0.3 MG/DL — SIGNIFICANT CHANGE UP (ref 0.2–1.2)
CREAT SERPL-MCNC: 0.63 MG/DL — SIGNIFICANT CHANGE UP (ref 0.5–1.3)
CRP SERPL-MCNC: 50 MG/L — HIGH
D DIMER BLD IA.RAPID-MCNC: 8652 NG/ML DDU — HIGH
FERRITIN SERPL-MCNC: 2950 NG/ML — HIGH (ref 15–150)
INR BLD: 1.46 RATIO — HIGH (ref 0.88–1.16)
PROT SERPL-MCNC: 5.7 GM/DL — LOW (ref 6–8.3)
PROTHROM AB SERPL-ACNC: 16.6 SEC — HIGH (ref 10.6–13.6)

## 2021-03-29 PROCEDURE — 99233 SBSQ HOSP IP/OBS HIGH 50: CPT

## 2021-03-29 RX ADMIN — APIXABAN 10 MILLIGRAM(S): 2.5 TABLET, FILM COATED ORAL at 06:36

## 2021-03-29 RX ADMIN — REMDESIVIR 500 MILLIGRAM(S): 5 INJECTION INTRAVENOUS at 18:50

## 2021-03-29 RX ADMIN — Medication 6 MILLIGRAM(S): at 06:36

## 2021-03-29 RX ADMIN — APIXABAN 10 MILLIGRAM(S): 2.5 TABLET, FILM COATED ORAL at 18:21

## 2021-03-29 RX ADMIN — Medication 20 MILLIGRAM(S): at 06:36

## 2021-03-29 NOTE — PROGRESS NOTE ADULT - ASSESSMENT
80 year old female with PMH of arthritis, HTN and chronic leg edema was presented with worsening shortness of breath. Patient was diagnosed with acute hypoxic respiratory failure and tested positive for COVID bilateral pneumonia.     Acute hypoxic respiratory failure with bilateral COVID pneumonia and YENNY PE. on NC 4-6 liters.  Cont eliquis and decadrone. no active gross bleeding.  Discussed with RT to taper down oxygen if possible. reviewed inflammatory markers.   Normocytic anemia. no active gross bleeding. stable.  Hx of HTN. controlled at this time. I will hold off home med for now and monitor. Cont iv hydralazine prn.   Chronic leg edema bilaterally. Cont low dose lasix. monitor swelling.  Insomnia. Cont ambien. fall precautions.    DVT ppx: Eliquis  Full code.     called Yunior on phone and updated her about the patient.

## 2021-03-30 LAB
ALBUMIN SERPL ELPH-MCNC: 2.4 G/DL — LOW (ref 3.3–5)
ALP SERPL-CCNC: 75 U/L — SIGNIFICANT CHANGE UP (ref 40–120)
ALT FLD-CCNC: 66 U/L — SIGNIFICANT CHANGE UP (ref 12–78)
AST SERPL-CCNC: 52 U/L — HIGH (ref 15–37)
BILIRUB DIRECT SERPL-MCNC: 0.12 MG/DL — SIGNIFICANT CHANGE UP (ref 0.05–0.2)
BILIRUB INDIRECT FLD-MCNC: 0.3 MG/DL — SIGNIFICANT CHANGE UP (ref 0.2–1)
BILIRUB SERPL-MCNC: 0.4 MG/DL — SIGNIFICANT CHANGE UP (ref 0.2–1.2)
CREAT SERPL-MCNC: 0.65 MG/DL — SIGNIFICANT CHANGE UP (ref 0.5–1.3)
INR BLD: 2.38 RATIO — HIGH (ref 0.88–1.16)
PROT SERPL-MCNC: 6.4 GM/DL — SIGNIFICANT CHANGE UP (ref 6–8.3)
PROTHROM AB SERPL-ACNC: 26.5 SEC — HIGH (ref 10.6–13.6)

## 2021-03-30 PROCEDURE — 99232 SBSQ HOSP IP/OBS MODERATE 35: CPT

## 2021-03-30 RX ORDER — DEXAMETHASONE 0.5 MG/5ML
6 ELIXIR ORAL DAILY
Refills: 0 | Status: COMPLETED | OUTPATIENT
Start: 2021-03-31 | End: 2021-04-03

## 2021-03-30 RX ADMIN — Medication 20 MILLIGRAM(S): at 05:29

## 2021-03-30 RX ADMIN — Medication 6 MILLIGRAM(S): at 05:29

## 2021-03-30 RX ADMIN — APIXABAN 10 MILLIGRAM(S): 2.5 TABLET, FILM COATED ORAL at 17:08

## 2021-03-30 RX ADMIN — APIXABAN 10 MILLIGRAM(S): 2.5 TABLET, FILM COATED ORAL at 05:29

## 2021-03-30 NOTE — PROGRESS NOTE ADULT - ASSESSMENT
80 year old female with PMH of arthritis, HTN and chronic leg edema was presented with worsening shortness of breath. Patient was diagnosed with acute hypoxic respiratory failure and tested positive for COVID bilateral pneumonia.     Acute hypoxic respiratory failure with bilateral COVID pneumonia and YENNY PE. on NC 4 liters.  Cont eliquis and change iv to oral decadrone. no active gross bleeding.  Discussed with nurse to assess for home oxygen requirement. PT eval to see patient needs home PT or HHA.   Normocytic anemia. no active gross bleeding. stable.  Hx of HTN. controlled I will hold off home med for now and monitor. Cont iv hydralazine prn.   Chronic leg edema bilaterally. Cont low dose lasix. monitor swelling.  Insomnia. Cont ambien. fall precautions.    DVT ppx: Eliquis  Full code.     called Welma.

## 2021-03-30 NOTE — PHYSICAL THERAPY INITIAL EVALUATION ADULT - GAIT TRAINING, PT EVAL
Patient will ambulate 500 feet with rolling walker independently for community ambulation in 3-4 weeks.

## 2021-03-30 NOTE — PHYSICAL THERAPY INITIAL EVALUATION ADULT - ADDITIONAL COMMENTS
As per patient, she lives in a private house with 8 steps to enter, 1 flight of steps once inside to bedroom and bathroom. Patient reports she is "active" and able to cook and clean for herself.

## 2021-03-30 NOTE — PROGRESS NOTE ADULT - ASSESSMENT
COVID-19 with pulmonary embolism  She does not merit tocilizumab at this point in time  Given rapid improvement in oxygenation, supect hypoxemia may have been more from PE than COVID-19  Patient is on AC    The clinical and experimental literature involving medications in SARS-CoV-2/COVID-19 evolves rapidly as we learn more about the virus.     A general COVID-19 severity of illness categorization (NIH.gov):  •Asymptomatic or Presymptomatic Infection: Individuals who test positive for SARS-CoV-2 by virologic testing using a molecular diagnostic (e.g., polymerase chain reaction) or antigen test, but have no symptoms.  •Mild Illness: Individuals who have any of the various signs and symptoms of COVID 19 (e.g., fever, cough, sore throat, malaise, headache, muscle pain) without shortness of breath, dyspnea, or abnormal chest imaging.  •Moderate Illness: Individuals who have evidence of lower respiratory disease by clinical assessment or imaging and a saturationof oxygen (SpO2) greater than or equal to 94% on room air.  •Severe Illness: Individuals who have respiratory frequency more than 30 breaths per minute, SpO2 less than 94% on room air,ratio of arterial partial pressure of oxygen to fraction of inspired oxygen (PaO2/FiO2) less than 300 mmHg, or lung infiltratesgreater than 50%.  •Critical Illness: Individuals who have respiratory failure, septic shock, and/or multiple organ dysfunction.    Patient's illness is characterized as severe    Prognostic factors associated with increased risk of mortality include age >50, Neutrophil:Lymphocyte ratio >5, Procalcitonin > 0.2, Ferritin >850, CRP >6, and D-Dimer >1000 (Jaylon et al, Lancet, Mach 2020). It should be said that the lab values in/of themselves are nonspecific in nature and can be abnormal for reasons other than SARS-CoV-2 infection. The likelihood of developing severe respiratory difficulty appears to increase in the second week after developing presenting symptoms.    Inflammatory markers are unfavorable    The use of doxycycline, hydroxychloroquine, azithromycin, ivermectin, or colchicine is not recommended.   The routine use of IL-1 and IL-6 inhibitors is not supported by presently available data. IL-6 inhibitors are potentially an option in very select circumstances.   The data regarding use of convalescent plasma is limited and outside of very limited circumstances insufficient to recommend its use.    Remdesivir has not consistently been shown to impact mortality. Thus far it has been shown to accomplish is decrease the length of hospitalization in patients with severe disease. In patient with moderate disease data showed clinical improvement in patient treated with 5 days of remdesivir, but not those treated for 10 days.    Criteria for use include:  • SpO2 < 94% on room air, OR requiring supplemental oxygen, OR requiring invasive mechanical ventilation, OR requiring ECMO (e.g moderate to critical disease)  • eGFR > 30 mL/min  • ALT < 5X ULN    Contraindications  • Use during pregnancy unless the potential benefits justify the potential risk for the mother and the fetus.  • Remdesivir should not be initiated in patients with ALT greater than or equal to 5 times the upper limit of normal (ULN) of baseline.  • Use in patients with renal impairment is based on potential risk/benefit considerations.  Remdesivir Dosing  • Adult patients greater than or equal to 40 k mG IV x 1 dose on day 1, followed by 100 mG IV q24h.  • Administration of Remdesivir in patients with eGFR less than 30 mL/min should be considered if the potential benefits outweigh the potential risks. There is a potential accumulation of cyclodextrin excipient found in Remdesivir.  Treatment Duration  • Mechanical ventilation and/or ECMO, duration is 10 days of treatment.  • Not requiring mechanical ventilation or ECMO, duration is 5 days of treatment.       If patient does not demonstrate clinical improvement, treatment may be extended for a total of 10 days if clinically indicated.  • Patients do not need to complete the course if they are stable for discharge.  Monitoring Parameters  • Daily renal and hepatic monitoring should be performed while on therapy       Discontinue therapy if patient is asymptomatic with ALT greater than or equal to 5 times the ULN, restart once ALT less than 5 times the ULN.       Discontinue therapy if ALT elevation is accompanied by signs or symptoms of liver inflammation or increasing conjugated bilirubin, alkaline phosphatase, or INR.  • Pregnancy Test  • Infusion-related reactions have been reported       Discontinue infusion and administer appropriate treatment.    In a large study, dexamethasone reduced  mortality reduced by 17% when adjusted for age/risk.    NIH recommendations on which patients should receive dexamethasone:  • In patients with severe COVID-19 who required oxygen support, the use of dexamethasone 6 mG daily for up to 10 days reduced mortality at 28 days in a preliminary analysis.  • The benefit of dexamethasone was most apparent in hospitalized patients who were mechanically ventilated. In other subgroup analysis the mortality reduction in ECMO or ventilator patients was 35% and in individuals requiring supplemental oxygen only mortality was decreased by 20%. However while those decreases are significant, it should be noted that mortality remained high in both groups (29% and 21.3%, respectively).   • There was no observed benefit of dexamethasone in patients who did not require oxygen support.  In subgroup analysis there was a trend toward higher mortality in patients who did not require oxygen or ventilatory support though it did not reach statistical signficance.     Monitoring, Adverse Effects, and Drug-Drug Interactions:  • Clinicians should closely monitor patients with COVID-19 who are receiving dexamethasone for adverse effects (e.g., hyperglycemia, secondary infections, psychiatric effects, avascular necrosis).  • Prolonged use of systemic corticosteroids may increase the risk of reactivation of latent infections (e.g., hepatitis B virus (HBV), herpesvirus infections, strongyloidiasis, tuberculosis).  • The risk of reactivation of latent infections for a 10-day course of dexamethasone (6 mG once daily) is not well-defined. When initiating dexamethasone, appropriate screening and treatment to reduce the risk of strongyloides superinfection in patients at high risk of strongyloidiasis (e.g. patients from tropical, subtropical, or warm, temperate regions or those engaged in agricultural activities) or fulminant reactivations of HBV should be considered.  • Dexamethasone should be continued for up to 10 days or until hospital discharge, whichever comes first.  Alternative medication:  • In case of dexamethasone shortage alternative medications may include methylprednisolone 32mG and prednisone 40mG.    3/30: no fevers, inflammatory markers are elevated, UCx contaminated but the pt has no complains of UTI, with normal renal and hepatic functions, s/p remdesivir, on decadron     Suggestions--  remdesivir- 5 day course complete   Continye dexamethasone- 10 day course  Precautions per protocol, ideally airborne and contact in negative pressure room  Supplemental oxygen as required to maintain adequate saturation.  Avoid NIPPV, high flow O2, nebulizers or other interventions which may increase the likelihood of aerosolization as much as feasible  High threshold for antibiotic use  Vigilance for secondary infection and other superimposed events  Monitor inflammatory markers and lab data  A/C per primary team.  No role for tocilizumab at this time    Msg sent to Dr. Orona

## 2021-03-30 NOTE — PHYSICAL THERAPY INITIAL EVALUATION ADULT - PRECAUTIONS/LIMITATIONS, REHAB EVAL
airborne/contact precautions, nasal cannula 4L/min/cardiac precautions/fall precautions/isolation precautions/oxygen therapy device and L/min

## 2021-03-31 LAB
ALBUMIN SERPL ELPH-MCNC: 2.3 G/DL — LOW (ref 3.3–5)
ALP SERPL-CCNC: 96 U/L — SIGNIFICANT CHANGE UP (ref 40–120)
ALT FLD-CCNC: 71 U/L — SIGNIFICANT CHANGE UP (ref 12–78)
AST SERPL-CCNC: 73 U/L — HIGH (ref 15–37)
BILIRUB DIRECT SERPL-MCNC: 0.13 MG/DL — SIGNIFICANT CHANGE UP (ref 0.05–0.2)
BILIRUB INDIRECT FLD-MCNC: 0.3 MG/DL — SIGNIFICANT CHANGE UP (ref 0.2–1)
BILIRUB SERPL-MCNC: 0.4 MG/DL — SIGNIFICANT CHANGE UP (ref 0.2–1.2)
CREAT SERPL-MCNC: 0.61 MG/DL — SIGNIFICANT CHANGE UP (ref 0.5–1.3)
INR BLD: 1.96 RATIO — HIGH (ref 0.88–1.16)
PROT SERPL-MCNC: 6 GM/DL — SIGNIFICANT CHANGE UP (ref 6–8.3)
PROTHROM AB SERPL-ACNC: 22 SEC — HIGH (ref 10.6–13.6)

## 2021-03-31 PROCEDURE — 99232 SBSQ HOSP IP/OBS MODERATE 35: CPT

## 2021-03-31 RX ORDER — SENNA PLUS 8.6 MG/1
2 TABLET ORAL AT BEDTIME
Refills: 0 | Status: DISCONTINUED | OUTPATIENT
Start: 2021-03-31 | End: 2021-04-06

## 2021-03-31 RX ORDER — SENNA PLUS 8.6 MG/1
2 TABLET ORAL ONCE
Refills: 0 | Status: COMPLETED | OUTPATIENT
Start: 2021-03-31 | End: 2021-03-31

## 2021-03-31 RX ADMIN — Medication 5 MILLIGRAM(S): at 02:16

## 2021-03-31 RX ADMIN — Medication 6 MILLIGRAM(S): at 05:09

## 2021-03-31 RX ADMIN — SENNA PLUS 2 TABLET(S): 8.6 TABLET ORAL at 22:00

## 2021-03-31 RX ADMIN — APIXABAN 10 MILLIGRAM(S): 2.5 TABLET, FILM COATED ORAL at 05:09

## 2021-03-31 RX ADMIN — ZOLPIDEM TARTRATE 5 MILLIGRAM(S): 10 TABLET ORAL at 22:02

## 2021-03-31 RX ADMIN — APIXABAN 10 MILLIGRAM(S): 2.5 TABLET, FILM COATED ORAL at 17:14

## 2021-03-31 RX ADMIN — Medication 5 MILLIGRAM(S): at 22:00

## 2021-03-31 RX ADMIN — SENNA PLUS 2 TABLET(S): 8.6 TABLET ORAL at 02:16

## 2021-03-31 NOTE — PROGRESS NOTE ADULT - ASSESSMENT
80 year old female with PMH of arthritis, HTN and chronic leg edema was presented with worsening shortness of breath. Patient was diagnosed with acute hypoxic respiratory failure and tested positive for COVID bilateral pneumonia.     Acute hypoxic respiratory failure with bilateral COVID pneumonia and YENNY PE.   on NC 4 liters.    Cont eliquis, on decadron. no active gross bleeding.    PT eval  2.	Normocytic anemia. no active gross bleeding. stable.  3.	Hx of HTN. controlled I will hold off home med for now and monitor. Cont iv hydralazine prn.   4.	Chronic leg edema bilaterally. Cont low dose lasix. monitor swelling.  5.	Insomnia. Cont ambien. fall precautions.    6.	DVT ppx: Eliquis  Full code.      80 year old female with PMH of arthritis, HTN and chronic leg edema was presented with worsening shortness of breath. Patient was diagnosed with acute hypoxic respiratory failure and tested positive for COVID bilateral pneumonia.     Acute hypoxic respiratory failure with bilateral COVID pneumonia and YENNY PE.   on NC 4 liters.    Cont eliquis, on decadron. no active gross bleeding. Pulmonary evaluation    PT eval  2.	Normocytic anemia. no active gross bleeding. stable.  3.	Hx of HTN. controlled I will hold off home med for now and monitor. Cont iv hydralazine prn.   4.	Chronic leg edema bilaterally. Cont low dose lasix. monitor swelling.  5.	Insomnia. Cont ambien. fall precautions.    6.	DVT ppx: Eliquis  Full code.

## 2021-03-31 NOTE — PROGRESS NOTE ADULT - ASSESSMENT
COVID-19 with pulmonary embolism  She does not merit tocilizumab at this point in time  Given rapid improvement in oxygenation, supect hypoxemia may have been more from PE than COVID-19  Patient is on AC    The clinical and experimental literature involving medications in SARS-CoV-2/COVID-19 evolves rapidly as we learn more about the virus.     A general COVID-19 severity of illness categorization (NIH.gov):  •Asymptomatic or Presymptomatic Infection: Individuals who test positive for SARS-CoV-2 by virologic testing using a molecular diagnostic (e.g., polymerase chain reaction) or antigen test, but have no symptoms.  •Mild Illness: Individuals who have any of the various signs and symptoms of COVID 19 (e.g., fever, cough, sore throat, malaise, headache, muscle pain) without shortness of breath, dyspnea, or abnormal chest imaging.  •Moderate Illness: Individuals who have evidence of lower respiratory disease by clinical assessment or imaging and a saturationof oxygen (SpO2) greater than or equal to 94% on room air.  •Severe Illness: Individuals who have respiratory frequency more than 30 breaths per minute, SpO2 less than 94% on room air,ratio of arterial partial pressure of oxygen to fraction of inspired oxygen (PaO2/FiO2) less than 300 mmHg, or lung infiltratesgreater than 50%.  •Critical Illness: Individuals who have respiratory failure, septic shock, and/or multiple organ dysfunction.    Patient's illness is characterized as severe    Prognostic factors associated with increased risk of mortality include age >50, Neutrophil:Lymphocyte ratio >5, Procalcitonin > 0.2, Ferritin >850, CRP >6, and D-Dimer >1000 (Jaylon et al, Lancet, Mach 2020). It should be said that the lab values in/of themselves are nonspecific in nature and can be abnormal for reasons other than SARS-CoV-2 infection. The likelihood of developing severe respiratory difficulty appears to increase in the second week after developing presenting symptoms.    Inflammatory markers are unfavorable    The use of doxycycline, hydroxychloroquine, azithromycin, ivermectin, or colchicine is not recommended.   The routine use of IL-1 and IL-6 inhibitors is not supported by presently available data. IL-6 inhibitors are potentially an option in very select circumstances.   The data regarding use of convalescent plasma is limited and outside of very limited circumstances insufficient to recommend its use.    Remdesivir has not consistently been shown to impact mortality. Thus far it has been shown to accomplish is decrease the length of hospitalization in patients with severe disease. In patient with moderate disease data showed clinical improvement in patient treated with 5 days of remdesivir, but not those treated for 10 days.    Criteria for use include:  • SpO2 < 94% on room air, OR requiring supplemental oxygen, OR requiring invasive mechanical ventilation, OR requiring ECMO (e.g moderate to critical disease)  • eGFR > 30 mL/min  • ALT < 5X ULN    Contraindications  • Use during pregnancy unless the potential benefits justify the potential risk for the mother and the fetus.  • Remdesivir should not be initiated in patients with ALT greater than or equal to 5 times the upper limit of normal (ULN) of baseline.  • Use in patients with renal impairment is based on potential risk/benefit considerations.  Remdesivir Dosing  • Adult patients greater than or equal to 40 k mG IV x 1 dose on day 1, followed by 100 mG IV q24h.  • Administration of Remdesivir in patients with eGFR less than 30 mL/min should be considered if the potential benefits outweigh the potential risks. There is a potential accumulation of cyclodextrin excipient found in Remdesivir.  Treatment Duration  • Mechanical ventilation and/or ECMO, duration is 10 days of treatment.  • Not requiring mechanical ventilation or ECMO, duration is 5 days of treatment.       If patient does not demonstrate clinical improvement, treatment may be extended for a total of 10 days if clinically indicated.  • Patients do not need to complete the course if they are stable for discharge.  Monitoring Parameters  • Daily renal and hepatic monitoring should be performed while on therapy       Discontinue therapy if patient is asymptomatic with ALT greater than or equal to 5 times the ULN, restart once ALT less than 5 times the ULN.       Discontinue therapy if ALT elevation is accompanied by signs or symptoms of liver inflammation or increasing conjugated bilirubin, alkaline phosphatase, or INR.  • Pregnancy Test  • Infusion-related reactions have been reported       Discontinue infusion and administer appropriate treatment.    In a large study, dexamethasone reduced  mortality reduced by 17% when adjusted for age/risk.    NIH recommendations on which patients should receive dexamethasone:  • In patients with severe COVID-19 who required oxygen support, the use of dexamethasone 6 mG daily for up to 10 days reduced mortality at 28 days in a preliminary analysis.  • The benefit of dexamethasone was most apparent in hospitalized patients who were mechanically ventilated. In other subgroup analysis the mortality reduction in ECMO or ventilator patients was 35% and in individuals requiring supplemental oxygen only mortality was decreased by 20%. However while those decreases are significant, it should be noted that mortality remained high in both groups (29% and 21.3%, respectively).   • There was no observed benefit of dexamethasone in patients who did not require oxygen support.  In subgroup analysis there was a trend toward higher mortality in patients who did not require oxygen or ventilatory support though it did not reach statistical signficance.     Monitoring, Adverse Effects, and Drug-Drug Interactions:  • Clinicians should closely monitor patients with COVID-19 who are receiving dexamethasone for adverse effects (e.g., hyperglycemia, secondary infections, psychiatric effects, avascular necrosis).  • Prolonged use of systemic corticosteroids may increase the risk of reactivation of latent infections (e.g., hepatitis B virus (HBV), herpesvirus infections, strongyloidiasis, tuberculosis).  • The risk of reactivation of latent infections for a 10-day course of dexamethasone (6 mG once daily) is not well-defined. When initiating dexamethasone, appropriate screening and treatment to reduce the risk of strongyloides superinfection in patients at high risk of strongyloidiasis (e.g. patients from tropical, subtropical, or warm, temperate regions or those engaged in agricultural activities) or fulminant reactivations of HBV should be considered.  • Dexamethasone should be continued for up to 10 days or until hospital discharge, whichever comes first.  Alternative medication:  • In case of dexamethasone shortage alternative medications may include methylprednisolone 32mG and prednisone 40mG.    3/30: no fevers, inflammatory markers are elevated, UCx contaminated but the pt has no complains of UTI, with normal renal and hepatic functions, s/p remdesivir, on decadron   3/31: remains afebrile, doing well on NC, somewhat hypotensive, spoke with pt's daughter via telephone and updated on current events as per pt's request     Suggestions--  remdesivir- 5 day course complete   Continye dexamethasone - changed to po - 10 day course  Precautions per protocol, ideally airborne and contact in negative pressure room  Supplemental oxygen as required to maintain adequate saturation.  Avoid NIPPV, high flow O2, nebulizers or other interventions which may increase the likelihood of aerosolization as much as feasible  High threshold for antibiotic use  Vigilance for secondary infection and other superimposed events  Monitor inflammatory markers and lab data  A/C per primary team.  No role for tocilizumab at this time  Wean off O2 as able

## 2021-04-01 PROCEDURE — 99232 SBSQ HOSP IP/OBS MODERATE 35: CPT

## 2021-04-01 RX ADMIN — Medication 6 MILLIGRAM(S): at 06:15

## 2021-04-01 RX ADMIN — Medication 5 MILLIGRAM(S): at 21:03

## 2021-04-01 RX ADMIN — Medication 20 MILLIGRAM(S): at 06:15

## 2021-04-01 RX ADMIN — APIXABAN 10 MILLIGRAM(S): 2.5 TABLET, FILM COATED ORAL at 18:24

## 2021-04-01 RX ADMIN — SENNA PLUS 2 TABLET(S): 8.6 TABLET ORAL at 21:03

## 2021-04-01 RX ADMIN — APIXABAN 10 MILLIGRAM(S): 2.5 TABLET, FILM COATED ORAL at 06:15

## 2021-04-01 NOTE — DIETITIAN NUTRITION RISK NOTIFICATION - TREATMENT: THE FOLLOWING DIET HAS BEEN RECOMMENDED
Diet, Regular (03-25-21 @ 23:26) [Active]  Recommend Low sodium, Liquid protein supplement 1 pkg=15 grams protein, 100 calories

## 2021-04-01 NOTE — PROGRESS NOTE ADULT - ASSESSMENT
COVID-19 with pulmonary embolism  She does not merit tocilizumab at this point in time  Given rapid improvement in oxygenation, supect hypoxemia may have been more from PE than COVID-19  Patient is on AC    The clinical and experimental literature involving medications in SARS-CoV-2/COVID-19 evolves rapidly as we learn more about the virus.     A general COVID-19 severity of illness categorization (NIH.gov):  •Asymptomatic or Presymptomatic Infection: Individuals who test positive for SARS-CoV-2 by virologic testing using a molecular diagnostic (e.g., polymerase chain reaction) or antigen test, but have no symptoms.  •Mild Illness: Individuals who have any of the various signs and symptoms of COVID 19 (e.g., fever, cough, sore throat, malaise, headache, muscle pain) without shortness of breath, dyspnea, or abnormal chest imaging.  •Moderate Illness: Individuals who have evidence of lower respiratory disease by clinical assessment or imaging and a saturationof oxygen (SpO2) greater than or equal to 94% on room air.  •Severe Illness: Individuals who have respiratory frequency more than 30 breaths per minute, SpO2 less than 94% on room air,ratio of arterial partial pressure of oxygen to fraction of inspired oxygen (PaO2/FiO2) less than 300 mmHg, or lung infiltratesgreater than 50%.  •Critical Illness: Individuals who have respiratory failure, septic shock, and/or multiple organ dysfunction.    Patient's illness is characterized as severe    Prognostic factors associated with increased risk of mortality include age >50, Neutrophil:Lymphocyte ratio >5, Procalcitonin > 0.2, Ferritin >850, CRP >6, and D-Dimer >1000 (Jaylon et al, Lancet, Mach 2020). It should be said that the lab values in/of themselves are nonspecific in nature and can be abnormal for reasons other than SARS-CoV-2 infection. The likelihood of developing severe respiratory difficulty appears to increase in the second week after developing presenting symptoms.    Inflammatory markers are unfavorable    The use of doxycycline, hydroxychloroquine, azithromycin, ivermectin, or colchicine is not recommended.   The routine use of IL-1 and IL-6 inhibitors is not supported by presently available data. IL-6 inhibitors are potentially an option in very select circumstances.   The data regarding use of convalescent plasma is limited and outside of very limited circumstances insufficient to recommend its use.    Remdesivir has not consistently been shown to impact mortality. Thus far it has been shown to accomplish is decrease the length of hospitalization in patients with severe disease. In patient with moderate disease data showed clinical improvement in patient treated with 5 days of remdesivir, but not those treated for 10 days.    Criteria for use include:  • SpO2 < 94% on room air, OR requiring supplemental oxygen, OR requiring invasive mechanical ventilation, OR requiring ECMO (e.g moderate to critical disease)  • eGFR > 30 mL/min  • ALT < 5X ULN    Contraindications  • Use during pregnancy unless the potential benefits justify the potential risk for the mother and the fetus.  • Remdesivir should not be initiated in patients with ALT greater than or equal to 5 times the upper limit of normal (ULN) of baseline.  • Use in patients with renal impairment is based on potential risk/benefit considerations.  Remdesivir Dosing  • Adult patients greater than or equal to 40 k mG IV x 1 dose on day 1, followed by 100 mG IV q24h.  • Administration of Remdesivir in patients with eGFR less than 30 mL/min should be considered if the potential benefits outweigh the potential risks. There is a potential accumulation of cyclodextrin excipient found in Remdesivir.  Treatment Duration  • Mechanical ventilation and/or ECMO, duration is 10 days of treatment.  • Not requiring mechanical ventilation or ECMO, duration is 5 days of treatment.       If patient does not demonstrate clinical improvement, treatment may be extended for a total of 10 days if clinically indicated.  • Patients do not need to complete the course if they are stable for discharge.  Monitoring Parameters  • Daily renal and hepatic monitoring should be performed while on therapy       Discontinue therapy if patient is asymptomatic with ALT greater than or equal to 5 times the ULN, restart once ALT less than 5 times the ULN.       Discontinue therapy if ALT elevation is accompanied by signs or symptoms of liver inflammation or increasing conjugated bilirubin, alkaline phosphatase, or INR.  • Pregnancy Test  • Infusion-related reactions have been reported       Discontinue infusion and administer appropriate treatment.    In a large study, dexamethasone reduced  mortality reduced by 17% when adjusted for age/risk.    NIH recommendations on which patients should receive dexamethasone:  • In patients with severe COVID-19 who required oxygen support, the use of dexamethasone 6 mG daily for up to 10 days reduced mortality at 28 days in a preliminary analysis.  • The benefit of dexamethasone was most apparent in hospitalized patients who were mechanically ventilated. In other subgroup analysis the mortality reduction in ECMO or ventilator patients was 35% and in individuals requiring supplemental oxygen only mortality was decreased by 20%. However while those decreases are significant, it should be noted that mortality remained high in both groups (29% and 21.3%, respectively).   • There was no observed benefit of dexamethasone in patients who did not require oxygen support.  In subgroup analysis there was a trend toward higher mortality in patients who did not require oxygen or ventilatory support though it did not reach statistical signficance.     Monitoring, Adverse Effects, and Drug-Drug Interactions:  • Clinicians should closely monitor patients with COVID-19 who are receiving dexamethasone for adverse effects (e.g., hyperglycemia, secondary infections, psychiatric effects, avascular necrosis).  • Prolonged use of systemic corticosteroids may increase the risk of reactivation of latent infections (e.g., hepatitis B virus (HBV), herpesvirus infections, strongyloidiasis, tuberculosis).  • The risk of reactivation of latent infections for a 10-day course of dexamethasone (6 mG once daily) is not well-defined. When initiating dexamethasone, appropriate screening and treatment to reduce the risk of strongyloides superinfection in patients at high risk of strongyloidiasis (e.g. patients from tropical, subtropical, or warm, temperate regions or those engaged in agricultural activities) or fulminant reactivations of HBV should be considered.  • Dexamethasone should be continued for up to 10 days or until hospital discharge, whichever comes first.  Alternative medication:  • In case of dexamethasone shortage alternative medications may include methylprednisolone 32mG and prednisone 40mG.    3/30: no fevers, inflammatory markers are elevated, UCx contaminated but the pt has no complains of UTI, with normal renal and hepatic functions, s/p remdesivir, on decadron   3/31: remains afebrile, doing well on NC, somewhat hypotensive, spoke with pt's daughter via telephone and updated on current events as per pt's request   : no fevers, no new cbc, LFTs slightly elevated, Cr ok, s/p remdesivir, on decadron, had an episode of O2 SAT 88% on NC - improved to 94-96% on 4L.     Suggestions--  remdesivir- 5 day course complete   Continue dexamethasone - changed to po - 10 day course  Precautions per protocol, ideally airborne and contact in negative pressure room  Supplemental oxygen as required to maintain adequate saturation.  Avoid NIPPV, high flow O2, nebulizers or other interventions which may increase the likelihood of aerosolization as much as feasible  High threshold for antibiotic use  Vigilance for secondary infection and other superimposed events  Monitor inflammatory markers and lab data  A/C per primary team.  No role for tocilizumab at this time  Wean off O2 as able

## 2021-04-01 NOTE — DIETITIAN INITIAL EVALUATION ADULT. - ORAL INTAKE PTA/DIET HISTORY
Pt lived c family who did the shopping/cooking, diet PTA; Low sodium, low cholesterol, pt does not eat meat/poultry/eggs, requested fish for lunch & dinner.  Pt c depressed oral intake from usual since dx of COVID.  RD educated pt to consume protein-energy intake.   Pt receptive to trying Liquid protein supplement, declined Ensure due to GI intolerance.

## 2021-04-01 NOTE — PROGRESS NOTE ADULT - ASSESSMENT
80 year old female with PMH of arthritis, HTN and chronic leg edema was presented with worsening shortness of breath. Patient was diagnosed with acute hypoxic respiratory failure and tested positive for COVID bilateral pneumonia.     Acute hypoxic respiratory failure with bilateral COVID pneumonia and YENNY PE.   - On NC 4, cont decadron  - Cont eliquis,   - Pulmonary evaluation    - PT eval  2.	Hx of HTN.   - controlled I will hold off home med for now and monitor.   3.	Chronic leg edema bilaterally. Cont low dose lasix. monitor swelling.  4.	Insomnia. Cont ambien. fall precautions.    5.	DVT ppx: Eliquis  Full code.

## 2021-04-01 NOTE — DIETITIAN INITIAL EVALUATION ADULT. - PERTINENT MEDS FT
MEDICATIONS  (STANDING):  apixaban 10 milliGRAM(s) Oral every 12 hours  bisacodyl 5 milliGRAM(s) Oral at bedtime  dexAMETHasone     Tablet 6 milliGRAM(s) Oral daily  furosemide    Tablet 20 milliGRAM(s) Oral daily  senna 2 Tablet(s) Oral at bedtime    MEDICATIONS  (PRN):  acetaminophen   Tablet .. 650 milliGRAM(s) Oral every 6 hours PRN Mild Pain (1 - 3)  zolpidem 5 milliGRAM(s) Oral at bedtime PRN Insomnia

## 2021-04-01 NOTE — DIETITIAN INITIAL EVALUATION ADULT. - PERTINENT LABORATORY DATA
04-01 Na--    Glu --    K+ --    Cr  0.74 mg/dL BUN --    03-26 Phos 3.7 mg/dL 04-01 Alb 2.5 g/dL<L>04-01 ALT 89 U/L<H> AST 73 U/L<H> Alkaline Phosphatase 86 U/L

## 2021-04-01 NOTE — DIETITIAN INITIAL EVALUATION ADULT. - PHYSCIAL ASSESSMENT
BMI=27.1(03/26), 03/26, 3+ edema of feet, ankles noted->1+ edema of legs->no edema noted at present/other (specify)

## 2021-04-01 NOTE — DIETITIAN INITIAL EVALUATION ADULT. - OTHER CALCULATIONS
IBW used for calculation of estimated needs. %IBW: 132%           % UBW: 98%, wt. loss of .8 kg/1.31% noted since adm

## 2021-04-02 DIAGNOSIS — R06.02 SHORTNESS OF BREATH: ICD-10-CM

## 2021-04-02 DIAGNOSIS — U07.1 COVID-19: ICD-10-CM

## 2021-04-02 LAB
ALBUMIN SERPL ELPH-MCNC: 2.3 G/DL — LOW (ref 3.3–5)
ALP SERPL-CCNC: 66 U/L — SIGNIFICANT CHANGE UP (ref 40–120)
ALT FLD-CCNC: 95 U/L — HIGH (ref 12–78)
AST SERPL-CCNC: 78 U/L — HIGH (ref 15–37)
BILIRUB DIRECT SERPL-MCNC: 0.16 MG/DL — SIGNIFICANT CHANGE UP (ref 0.05–0.2)
BILIRUB INDIRECT FLD-MCNC: 0.3 MG/DL — SIGNIFICANT CHANGE UP (ref 0.2–1)
BILIRUB SERPL-MCNC: 0.5 MG/DL — SIGNIFICANT CHANGE UP (ref 0.2–1.2)
CREAT SERPL-MCNC: 0.63 MG/DL — SIGNIFICANT CHANGE UP (ref 0.5–1.3)
INR BLD: 1.49 RATIO — HIGH (ref 0.88–1.16)
PROT SERPL-MCNC: 6.2 GM/DL — SIGNIFICANT CHANGE UP (ref 6–8.3)
PROTHROM AB SERPL-ACNC: 17 SEC — HIGH (ref 10.6–13.6)

## 2021-04-02 PROCEDURE — 99232 SBSQ HOSP IP/OBS MODERATE 35: CPT

## 2021-04-02 RX ORDER — APIXABAN 2.5 MG/1
5 TABLET, FILM COATED ORAL
Refills: 0 | Status: DISCONTINUED | OUTPATIENT
Start: 2021-04-02 | End: 2021-04-06

## 2021-04-02 RX ADMIN — Medication 20 MILLIGRAM(S): at 05:10

## 2021-04-02 RX ADMIN — APIXABAN 10 MILLIGRAM(S): 2.5 TABLET, FILM COATED ORAL at 05:10

## 2021-04-02 RX ADMIN — Medication 6 MILLIGRAM(S): at 05:10

## 2021-04-02 RX ADMIN — ZOLPIDEM TARTRATE 5 MILLIGRAM(S): 10 TABLET ORAL at 21:56

## 2021-04-02 RX ADMIN — Medication 5 MILLIGRAM(S): at 21:56

## 2021-04-02 RX ADMIN — APIXABAN 5 MILLIGRAM(S): 2.5 TABLET, FILM COATED ORAL at 17:23

## 2021-04-02 NOTE — PROGRESS NOTE ADULT - ASSESSMENT
COVID-19 with pulmonary embolism  She does not merit tocilizumab at this point in time  Given rapid improvement in oxygenation, supect hypoxemia may have been more from PE than COVID-19  Patient is on AC    The clinical and experimental literature involving medications in SARS-CoV-2/COVID-19 evolves rapidly as we learn more about the virus.     A general COVID-19 severity of illness categorization (NIH.gov):  •Asymptomatic or Presymptomatic Infection: Individuals who test positive for SARS-CoV-2 by virologic testing using a molecular diagnostic (e.g., polymerase chain reaction) or antigen test, but have no symptoms.  •Mild Illness: Individuals who have any of the various signs and symptoms of COVID 19 (e.g., fever, cough, sore throat, malaise, headache, muscle pain) without shortness of breath, dyspnea, or abnormal chest imaging.  •Moderate Illness: Individuals who have evidence of lower respiratory disease by clinical assessment or imaging and a saturationof oxygen (SpO2) greater than or equal to 94% on room air.  •Severe Illness: Individuals who have respiratory frequency more than 30 breaths per minute, SpO2 less than 94% on room air,ratio of arterial partial pressure of oxygen to fraction of inspired oxygen (PaO2/FiO2) less than 300 mmHg, or lung infiltratesgreater than 50%.  •Critical Illness: Individuals who have respiratory failure, septic shock, and/or multiple organ dysfunction.    Patient's illness is characterized as severe    Prognostic factors associated with increased risk of mortality include age >50, Neutrophil:Lymphocyte ratio >5, Procalcitonin > 0.2, Ferritin >850, CRP >6, and D-Dimer >1000 (Jaylon et al, Lancet, Mach 2020). It should be said that the lab values in/of themselves are nonspecific in nature and can be abnormal for reasons other than SARS-CoV-2 infection. The likelihood of developing severe respiratory difficulty appears to increase in the second week after developing presenting symptoms.    Inflammatory markers are unfavorable    The use of doxycycline, hydroxychloroquine, azithromycin, ivermectin, or colchicine is not recommended.   The routine use of IL-1 and IL-6 inhibitors is not supported by presently available data. IL-6 inhibitors are potentially an option in very select circumstances.   The data regarding use of convalescent plasma is limited and outside of very limited circumstances insufficient to recommend its use.    Remdesivir has not consistently been shown to impact mortality. Thus far it has been shown to accomplish is decrease the length of hospitalization in patients with severe disease. In patient with moderate disease data showed clinical improvement in patient treated with 5 days of remdesivir, but not those treated for 10 days.    Criteria for use include:  • SpO2 < 94% on room air, OR requiring supplemental oxygen, OR requiring invasive mechanical ventilation, OR requiring ECMO (e.g moderate to critical disease)  • eGFR > 30 mL/min  • ALT < 5X ULN    Contraindications  • Use during pregnancy unless the potential benefits justify the potential risk for the mother and the fetus.  • Remdesivir should not be initiated in patients with ALT greater than or equal to 5 times the upper limit of normal (ULN) of baseline.  • Use in patients with renal impairment is based on potential risk/benefit considerations.  Remdesivir Dosing  • Adult patients greater than or equal to 40 k mG IV x 1 dose on day 1, followed by 100 mG IV q24h.  • Administration of Remdesivir in patients with eGFR less than 30 mL/min should be considered if the potential benefits outweigh the potential risks. There is a potential accumulation of cyclodextrin excipient found in Remdesivir.  Treatment Duration  • Mechanical ventilation and/or ECMO, duration is 10 days of treatment.  • Not requiring mechanical ventilation or ECMO, duration is 5 days of treatment.       If patient does not demonstrate clinical improvement, treatment may be extended for a total of 10 days if clinically indicated.  • Patients do not need to complete the course if they are stable for discharge.  Monitoring Parameters  • Daily renal and hepatic monitoring should be performed while on therapy       Discontinue therapy if patient is asymptomatic with ALT greater than or equal to 5 times the ULN, restart once ALT less than 5 times the ULN.       Discontinue therapy if ALT elevation is accompanied by signs or symptoms of liver inflammation or increasing conjugated bilirubin, alkaline phosphatase, or INR.  • Pregnancy Test  • Infusion-related reactions have been reported       Discontinue infusion and administer appropriate treatment.    In a large study, dexamethasone reduced  mortality reduced by 17% when adjusted for age/risk.    NIH recommendations on which patients should receive dexamethasone:  • In patients with severe COVID-19 who required oxygen support, the use of dexamethasone 6 mG daily for up to 10 days reduced mortality at 28 days in a preliminary analysis.  • The benefit of dexamethasone was most apparent in hospitalized patients who were mechanically ventilated. In other subgroup analysis the mortality reduction in ECMO or ventilator patients was 35% and in individuals requiring supplemental oxygen only mortality was decreased by 20%. However while those decreases are significant, it should be noted that mortality remained high in both groups (29% and 21.3%, respectively).   • There was no observed benefit of dexamethasone in patients who did not require oxygen support.  In subgroup analysis there was a trend toward higher mortality in patients who did not require oxygen or ventilatory support though it did not reach statistical signficance.     Monitoring, Adverse Effects, and Drug-Drug Interactions:  • Clinicians should closely monitor patients with COVID-19 who are receiving dexamethasone for adverse effects (e.g., hyperglycemia, secondary infections, psychiatric effects, avascular necrosis).  • Prolonged use of systemic corticosteroids may increase the risk of reactivation of latent infections (e.g., hepatitis B virus (HBV), herpesvirus infections, strongyloidiasis, tuberculosis).  • The risk of reactivation of latent infections for a 10-day course of dexamethasone (6 mG once daily) is not well-defined. When initiating dexamethasone, appropriate screening and treatment to reduce the risk of strongyloides superinfection in patients at high risk of strongyloidiasis (e.g. patients from tropical, subtropical, or warm, temperate regions or those engaged in agricultural activities) or fulminant reactivations of HBV should be considered.  • Dexamethasone should be continued for up to 10 days or until hospital discharge, whichever comes first.  Alternative medication:  • In case of dexamethasone shortage alternative medications may include methylprednisolone 32mG and prednisone 40mG.    3/30: no fevers, inflammatory markers are elevated, UCx contaminated but the pt has no complains of UTI, with normal renal and hepatic functions, s/p remdesivir, on decadron   3/31: remains afebrile, doing well on NC, somewhat hypotensive, spoke with pt's daughter via telephone and updated on current events as per pt's request   : no fevers, no new cbc, LFTs slightly elevated, Cr ok, s/p remdesivir, on decadron, had an episode of O2 SAT 88% on NC - improved to 94-96% on 4L.   : remains afebrile, no new cbc, LFTs elevated, Cr ok, on 4L NC with O2 % during my exam, on decadron, s/p remdesivir     Suggestions--  remdesivir- 5 day course complete   Continue dexamethasone - changed to po - 10 day course  Precautions per protocol, ideally airborne and contact in negative pressure room  Supplemental oxygen as required to maintain adequate saturation.  Avoid NIPPV, high flow O2, nebulizers or other interventions which may increase the likelihood of aerosolization as much as feasible  High threshold for antibiotic use  Vigilance for secondary infection and other superimposed events  Monitor inflammatory markers and lab data  A/C per primary team.  No role for tocilizumab  Wean off O2 as able

## 2021-04-02 NOTE — PROGRESS NOTE ADULT - ASSESSMENT
80 year old female with PMH of arthritis, HTN and chronic leg edema was presented with worsening shortness of breath. Patient was diagnosed with acute hypoxic respiratory failure and tested positive for COVID bilateral pneumonia.     Acute hypoxic respiratory failure with bilateral COVID pneumonia and YENNY PE.   - On NC 4, cont decadron  -  s/p remedesivir  - Cont eliquis,   - Pulmonary evaluation    - PT eval  2.	Hx of HTN.   - controlled I will hold off home med for now and monitor.   3.	Chronic leg edema bilaterally. Cont low dose lasix. monitor swelling.  4.	Insomnia. Cont ambien. fall precautions.    5.	DVT ppx: Eliquis  Full code.

## 2021-04-03 LAB
ALBUMIN SERPL ELPH-MCNC: 2.5 G/DL — LOW (ref 3.3–5)
ALP SERPL-CCNC: 68 U/L — SIGNIFICANT CHANGE UP (ref 40–120)
ALT FLD-CCNC: 106 U/L — HIGH (ref 12–78)
AST SERPL-CCNC: 85 U/L — HIGH (ref 15–37)
BILIRUB DIRECT SERPL-MCNC: 0.12 MG/DL — SIGNIFICANT CHANGE UP (ref 0.05–0.2)
BILIRUB INDIRECT FLD-MCNC: 0.2 MG/DL — SIGNIFICANT CHANGE UP (ref 0.2–1)
BILIRUB SERPL-MCNC: 0.3 MG/DL — SIGNIFICANT CHANGE UP (ref 0.2–1.2)
CREAT SERPL-MCNC: 0.6 MG/DL — SIGNIFICANT CHANGE UP (ref 0.5–1.3)
INR BLD: 1.22 RATIO — HIGH (ref 0.88–1.16)
PROT SERPL-MCNC: 5.8 GM/DL — LOW (ref 6–8.3)
PROTHROM AB SERPL-ACNC: 14 SEC — HIGH (ref 10.6–13.6)

## 2021-04-03 PROCEDURE — 99232 SBSQ HOSP IP/OBS MODERATE 35: CPT

## 2021-04-03 RX ORDER — SODIUM CHLORIDE 9 MG/ML
500 INJECTION INTRAMUSCULAR; INTRAVENOUS; SUBCUTANEOUS ONCE
Refills: 0 | Status: COMPLETED | OUTPATIENT
Start: 2021-04-03 | End: 2021-04-03

## 2021-04-03 RX ADMIN — Medication 20 MILLIGRAM(S): at 05:49

## 2021-04-03 RX ADMIN — APIXABAN 5 MILLIGRAM(S): 2.5 TABLET, FILM COATED ORAL at 05:49

## 2021-04-03 RX ADMIN — Medication 5 MILLIGRAM(S): at 22:16

## 2021-04-03 RX ADMIN — SENNA PLUS 2 TABLET(S): 8.6 TABLET ORAL at 22:16

## 2021-04-03 RX ADMIN — APIXABAN 5 MILLIGRAM(S): 2.5 TABLET, FILM COATED ORAL at 17:24

## 2021-04-03 RX ADMIN — Medication 6 MILLIGRAM(S): at 05:49

## 2021-04-03 RX ADMIN — SODIUM CHLORIDE 1000 MILLILITER(S): 9 INJECTION INTRAMUSCULAR; INTRAVENOUS; SUBCUTANEOUS at 13:26

## 2021-04-03 NOTE — PROGRESS NOTE ADULT - ASSESSMENT
80 year old female with PMH of arthritis, HTN and chronic leg edema was presented with worsening shortness of breath. Patient was diagnosed with acute hypoxic respiratory failure and tested positive for COVID bilateral pneumonia.     Acute hypoxic respiratory failure with bilateral COVID pneumonia and YENNY PE.   - On NC 4, cont decadron  -  s/p remedesivir  - Cont eliquis,   - Pulmonary evaluation    - PT eval  2.	Hx of HTN.   - has been hypotensive but asymptomatic   3.	Chronic leg edema bilaterally. Cont low dose lasix. monitor swelling.  4.	Insomnia. Cont ambien. fall precautions.    5.	DVT ppx: Eliquis  Full code.

## 2021-04-04 DIAGNOSIS — U07.1 COVID-19: ICD-10-CM

## 2021-04-04 LAB
RAPID RVP RESULT: DETECTED
SARS-COV-2 RNA SPEC QL NAA+PROBE: DETECTED

## 2021-04-04 PROCEDURE — 99232 SBSQ HOSP IP/OBS MODERATE 35: CPT

## 2021-04-04 RX ORDER — SODIUM CHLORIDE 9 MG/ML
500 INJECTION INTRAMUSCULAR; INTRAVENOUS; SUBCUTANEOUS ONCE
Refills: 0 | Status: COMPLETED | OUTPATIENT
Start: 2021-04-04 | End: 2021-04-04

## 2021-04-04 RX ORDER — MIDODRINE HYDROCHLORIDE 2.5 MG/1
5 TABLET ORAL THREE TIMES A DAY
Refills: 0 | Status: DISCONTINUED | OUTPATIENT
Start: 2021-04-04 | End: 2021-04-06

## 2021-04-04 RX ADMIN — APIXABAN 5 MILLIGRAM(S): 2.5 TABLET, FILM COATED ORAL at 17:52

## 2021-04-04 RX ADMIN — Medication 20 MILLIGRAM(S): at 05:25

## 2021-04-04 RX ADMIN — APIXABAN 5 MILLIGRAM(S): 2.5 TABLET, FILM COATED ORAL at 05:25

## 2021-04-04 RX ADMIN — SODIUM CHLORIDE 1000 MILLILITER(S): 9 INJECTION INTRAMUSCULAR; INTRAVENOUS; SUBCUTANEOUS at 11:34

## 2021-04-04 RX ADMIN — MIDODRINE HYDROCHLORIDE 5 MILLIGRAM(S): 2.5 TABLET ORAL at 17:52

## 2021-04-04 NOTE — PROGRESS NOTE ADULT - ASSESSMENT
80 year old female with PMH of arthritis, HTN and chronic leg edema was presented with worsening shortness of breath. Patient was diagnosed with acute hypoxic respiratory failure and tested positive for COVID bilateral pneumonia.     Acute hypoxic respiratory failure with bilateral COVID pneumonia and YENNY PE.   - On NC weaned to 2L cont decadron  -  s/p remedesivir  - Cont eliquis,   - Pulmonary evaluation    - PT eval    2.	Hx of HTN.   - has been hypotensive but asymptomatic , add midodrine   3.	Chronic leg edema bilaterally. Cont low dose lasix. monitor swelling.  4.	Insomnia. Cont ambien. fall precautions.    5.	DVT ppx: Eliquis  Full code.

## 2021-04-05 ENCOUNTER — TRANSCRIPTION ENCOUNTER (OUTPATIENT)
Age: 81
End: 2021-04-05

## 2021-04-05 PROCEDURE — 99232 SBSQ HOSP IP/OBS MODERATE 35: CPT

## 2021-04-05 RX ORDER — MIDODRINE HYDROCHLORIDE 2.5 MG/1
1 TABLET ORAL
Qty: 90 | Refills: 0
Start: 2021-04-05 | End: 2021-05-04

## 2021-04-05 RX ORDER — APIXABAN 2.5 MG/1
1 TABLET, FILM COATED ORAL
Qty: 60 | Refills: 1
Start: 2021-04-05 | End: 2021-06-03

## 2021-04-05 RX ORDER — AMLODIPINE BESYLATE 2.5 MG/1
1 TABLET ORAL
Qty: 0 | Refills: 0 | DISCHARGE

## 2021-04-05 RX ADMIN — MIDODRINE HYDROCHLORIDE 5 MILLIGRAM(S): 2.5 TABLET ORAL at 11:08

## 2021-04-05 RX ADMIN — MIDODRINE HYDROCHLORIDE 5 MILLIGRAM(S): 2.5 TABLET ORAL at 05:43

## 2021-04-05 RX ADMIN — APIXABAN 5 MILLIGRAM(S): 2.5 TABLET, FILM COATED ORAL at 05:43

## 2021-04-05 RX ADMIN — SENNA PLUS 2 TABLET(S): 8.6 TABLET ORAL at 21:22

## 2021-04-05 RX ADMIN — MIDODRINE HYDROCHLORIDE 5 MILLIGRAM(S): 2.5 TABLET ORAL at 17:29

## 2021-04-05 RX ADMIN — Medication 5 MILLIGRAM(S): at 21:22

## 2021-04-05 RX ADMIN — Medication 20 MILLIGRAM(S): at 05:43

## 2021-04-05 RX ADMIN — APIXABAN 5 MILLIGRAM(S): 2.5 TABLET, FILM COATED ORAL at 17:29

## 2021-04-05 NOTE — DISCHARGE NOTE PROVIDER - NSDCFUADDINST_GEN_ALL_CORE_FT
You were diagnosed with COVID Pneumonia.  You were treated with Remdesivir and Dexamethasone. You have been successfully weaned off of oxygen.    You were also diagnosed with a blood clot in your lungs.  You were prescribed a blood thinner that you will need to take for 6 months and follow up with your PCP.     Home PT will also be arranged. Please also monitor your oxygen saturation with a pulse oximeter and maintain oxygen saturation > 93%.

## 2021-04-05 NOTE — DISCHARGE NOTE PROVIDER - DETAILS OF MALNUTRITION DIAGNOSIS/DIAGNOSES
This patient has been assessed with a concern for Malnutrition and was treated during this hospitalization for the following Nutrition diagnosis/diagnoses:     -  04/01/2021: Moderate protein-calorie malnutrition

## 2021-04-05 NOTE — PROGRESS NOTE ADULT - ASSESSMENT
80 year old female with PMH of arthritis, HTN and chronic leg edema was presented with worsening shortness of breath. Patient was diagnosed with acute hypoxic respiratory failure and tested positive for COVID bilateral pneumonia.     Acute hypoxic respiratory failure with bilateral COVID pneumonia and YENNY PE.   - On NC weaned to 2L cont decadron  -  s/p remedesivir  - Cont eliquis,   - Pulmonary evaluation    - PT eval    2.	Hx of HTN.   - has been hypotensive but asymptomatic , add midodrine   3.	Chronic leg edema bilaterally. Cont low dose lasix. monitor swelling.  4.	Insomnia. Cont ambien. fall precautions.    5.	DVT ppx: Eliquis  Full code.   6. Discharge planning home with Home PT 24 to 48 hours.

## 2021-04-05 NOTE — DISCHARGE NOTE PROVIDER - NSDCMRMEDTOKEN_GEN_ALL_CORE_FT
apixaban 5 mg oral tablet: 1 tab(s) orally 2 times a day  Iron 100 Plus oral tablet: 1 tab(s) orally once a day  Lasix 40 mg oral tablet: 1 tab(s) orally once a day  midodrine 5 mg oral tablet: 1 tab(s) orally 3 times a day  Vitamin C 100 mg oral tablet: 1  orally once a day

## 2021-04-05 NOTE — DISCHARGE NOTE PROVIDER - NSDCCPCAREPLAN_GEN_ALL_CORE_FT
PRINCIPAL DISCHARGE DIAGNOSIS  Diagnosis: Pneumonia due to COVID-19 virus  Assessment and Plan of Treatment: Treated with Dexamethasone and Remdesivir      SECONDARY DISCHARGE DIAGNOSES  Diagnosis: Pulmonary embolism  Assessment and Plan of Treatment: Eliquis

## 2021-04-05 NOTE — DISCHARGE NOTE PROVIDER - NSDCADMDATE_GEN_ALL_CORE_FT
----- Message from Enzo Mills RN sent at 1/4/2021  4:53 PM EST -----  Pt cancelled appt for today and is requesting that we reschedule it ASAP. Pt having symptoms of low hgb. Pt is to have labs done and see NP this week ideally.   Thanks!    
PT CALLED AND SCHEDULED FOR AN APPT ON 1/7/21  
25-Mar-2021 17:12

## 2021-04-05 NOTE — DISCHARGE NOTE PROVIDER - HOSPITAL COURSE
80 year old female with PMH of arthritis, HTN and chronic leg edema was presented with worsening shortness of breath. Patient was diagnosed with acute hypoxic respiratory failure and tested positive for COVID bilateral pneumonia.     Acute hypoxic respiratory failure with bilateral COVID pneumonia and YENNY PE.   - Was on NC and has been weaned off to Room Air  -  Completed Remdesivir and Decadron  -  Eliquis to be continued for 6 months     Hx of HTN.   - has been hypotensive but asymptomatic   - All anti - htn medications have been discontinued   - Midodrine added    Chronic leg edema bilaterally.   Cont low dose lasix. monitor swelling.    Insomnia. Cont ambien. fall precautions.

## 2021-04-06 VITALS
TEMPERATURE: 98 F | OXYGEN SATURATION: 93 % | DIASTOLIC BLOOD PRESSURE: 67 MMHG | HEART RATE: 80 BPM | RESPIRATION RATE: 18 BRPM | SYSTOLIC BLOOD PRESSURE: 108 MMHG

## 2021-04-06 LAB
ANION GAP SERPL CALC-SCNC: 5 MMOL/L — SIGNIFICANT CHANGE UP (ref 5–17)
BUN SERPL-MCNC: 14 MG/DL — SIGNIFICANT CHANGE UP (ref 7–23)
CALCIUM SERPL-MCNC: 8.7 MG/DL — SIGNIFICANT CHANGE UP (ref 8.5–10.1)
CHLORIDE SERPL-SCNC: 106 MMOL/L — SIGNIFICANT CHANGE UP (ref 96–108)
CO2 SERPL-SCNC: 31 MMOL/L — SIGNIFICANT CHANGE UP (ref 22–31)
CREAT SERPL-MCNC: 0.62 MG/DL — SIGNIFICANT CHANGE UP (ref 0.5–1.3)
GLUCOSE SERPL-MCNC: 78 MG/DL — SIGNIFICANT CHANGE UP (ref 70–99)
HCT VFR BLD CALC: 33.3 % — LOW (ref 34.5–45)
HGB BLD-MCNC: 10.3 G/DL — LOW (ref 11.5–15.5)
MCHC RBC-ENTMCNC: 29.7 PG — SIGNIFICANT CHANGE UP (ref 27–34)
MCHC RBC-ENTMCNC: 30.9 GM/DL — LOW (ref 32–36)
MCV RBC AUTO: 96 FL — SIGNIFICANT CHANGE UP (ref 80–100)
NRBC # BLD: 0 /100 WBCS — SIGNIFICANT CHANGE UP (ref 0–0)
PLATELET # BLD AUTO: 237 K/UL — SIGNIFICANT CHANGE UP (ref 150–400)
POTASSIUM SERPL-MCNC: 4.1 MMOL/L — SIGNIFICANT CHANGE UP (ref 3.5–5.3)
POTASSIUM SERPL-SCNC: 4.1 MMOL/L — SIGNIFICANT CHANGE UP (ref 3.5–5.3)
RBC # BLD: 3.47 M/UL — LOW (ref 3.8–5.2)
RBC # FLD: 13.2 % — SIGNIFICANT CHANGE UP (ref 10.3–14.5)
SODIUM SERPL-SCNC: 142 MMOL/L — SIGNIFICANT CHANGE UP (ref 135–145)
WBC # BLD: 10.79 K/UL — HIGH (ref 3.8–10.5)
WBC # FLD AUTO: 10.79 K/UL — HIGH (ref 3.8–10.5)

## 2021-04-06 PROCEDURE — 99239 HOSP IP/OBS DSCHRG MGMT >30: CPT

## 2021-04-06 RX ADMIN — APIXABAN 5 MILLIGRAM(S): 2.5 TABLET, FILM COATED ORAL at 17:19

## 2021-04-06 RX ADMIN — APIXABAN 5 MILLIGRAM(S): 2.5 TABLET, FILM COATED ORAL at 05:44

## 2021-04-06 RX ADMIN — Medication 20 MILLIGRAM(S): at 05:44

## 2021-04-06 RX ADMIN — MIDODRINE HYDROCHLORIDE 5 MILLIGRAM(S): 2.5 TABLET ORAL at 11:19

## 2021-04-06 RX ADMIN — MIDODRINE HYDROCHLORIDE 5 MILLIGRAM(S): 2.5 TABLET ORAL at 16:26

## 2021-04-06 RX ADMIN — MIDODRINE HYDROCHLORIDE 5 MILLIGRAM(S): 2.5 TABLET ORAL at 05:44

## 2021-04-06 NOTE — PROGRESS NOTE ADULT - NSICDXPILOT_GEN_ALL_CORE
[place]
[employer]
[place]

## 2021-04-06 NOTE — PROGRESS NOTE ADULT - REASON FOR ADMISSION
worsening shortness of breath and fever with headache

## 2021-04-06 NOTE — PROGRESS NOTE ADULT - SUBJECTIVE AND OBJECTIVE BOX
Patient is a 80y old  Female who presents with a chief complaint of worsening shortness of breath and fever with headache (03 Apr 2021 12:31)      INTERVAL HPI/OVERNIGHT EVENTS: no events     MEDICATIONS  (STANDING):  apixaban 5 milliGRAM(s) Oral two times a day  bisacodyl 5 milliGRAM(s) Oral at bedtime  furosemide    Tablet 20 milliGRAM(s) Oral daily  midodrine. 5 milliGRAM(s) Oral three times a day  senna 2 Tablet(s) Oral at bedtime    MEDICATIONS  (PRN):  acetaminophen   Tablet .. 650 milliGRAM(s) Oral every 6 hours PRN Mild Pain (1 - 3)      Allergies    No Known Allergies    Intolerances         Vital Signs Last 24 Hrs  T(C): 36.7 (04 Apr 2021 10:45), Max: 36.8 (03 Apr 2021 16:35)  T(F): 98 (04 Apr 2021 10:45), Max: 98.3 (03 Apr 2021 16:35)  HR: 72 (04 Apr 2021 11:06) (60 - 86)  BP: 76/52 (04 Apr 2021 11:06) (76/52 - 131/71)  BP(mean): 79 (03 Apr 2021 16:35) (79 - 79)  RR: 19 (04 Apr 2021 10:45) (18 - 19)  SpO2: 96% (04 Apr 2021 10:45) (95% - 100%)    PHYSICAL EXAM:  GENERAL: NAD, well-groomed, well-developed  HEAD:  Atraumatic, Normocephalic  EYES: EOMI, PERRLA, conjunctiva and sclera clear  ENMT: No tonsillar erythema, exudates, or enlargement; Moist mucous membranes, Good dentition, No lesions  NECK: Supple, No JVD, Normal thyroid  NERVOUS SYSTEM:  Alert & Oriented X3, Good concentration; Motor Strength 5/5 B/L upper and lower extremities; DTRs 2+ intact and symmetric  CHEST/LUNG: Clear to percussion bilaterally; No rales, rhonchi, wheezing, or rubs  HEART: Regular rate and rhythm; No murmurs, rubs, or gallops  ABDOMEN: Soft, Nontender, Nondistended; Bowel sounds present  EXTREMITIES:  2+ Peripheral Pulses, No clubbing, cyanosis, or edema  LYMPH: No lymphadenopathy noted  SKIN: No rashes or lesions    LABS:    04-03    x   |  x   |  x   ----------------------------<  x   x    |  x   |  0.60      TPro  5.8<L>  /  Alb  2.5<L>  /  TBili  0.3  /  DBili  .12  /  AST  85<H>  /  ALT  106<H>  /  AlkPhos  68  04-03    PT/INR - ( 03 Apr 2021 06:39 )   PT: 14.0 sec;   INR: 1.22 ratio             CAPILLARY BLOOD GLUCOSE          RADIOLOGY & ADDITIONAL TESTS:    Imaging Personally Reviewed:  [ X] YES  [ ] NO    Consultant(s) Notes Reviewed:  [ X] YES  [ ] NO    Care Discussed with Consultants/Other Providers [X ] YES  [ ] NO
CHIEF COMPLAINT: + SOB and cough  was on high flow but then placed on 6 liter NC oyxgnen  80% on room air  no chest pain  no diarrhea  no dysuria  no active gross bleeding  + back pain.       PHYSICAL EXAM:    GENERAL: elderly pleasant and on NC oxygen  CHEST/LUNG: decreased air entry bibasally, no wheezing, no crackles   HEART: Regular rate and rhythm; No murmurs, rubs  ABDOMEN: Soft, Nontender, Nondistended; Bowel sounds present  EXTREMITIES:  Moving all four extremities spontaneously, No clubbing, cyanosis. Chronic leg edema bilaterally.   NERVOUS SYSTEM:  Grossly non focal.  Psychiatry: AAO x 3, mood is appropriate       OBJECTIVE DATA:   Vital Signs Last 24 Hrs  T(C): 36.7 (26 Mar 2021 11:49), Max: 37.8 (25 Mar 2021 15:03)  T(F): 98.1 (26 Mar 2021 11:49), Max: 100.1 (25 Mar 2021 15:03)  HR: 63 (26 Mar 2021 11:49) (48 - 93)  BP: 102/63 (26 Mar 2021 11:49) (102/63 - 150/74)  BP(mean): --  RR: 18 (26 Mar 2021 12:30) (16 - 24)  SpO2: 97% (26 Mar 2021 12:30) (92% - 100%)           Daily Height in cm: 149.86 (26 Mar 2021 00:45)    Daily   LABS:                        10.9   8.98  )-----------( 209      ( 26 Mar 2021 06:42 )             34.8             03-    142  |  108  |  10  ----------------------------<  125<H>  3.6   |  26  |  0.66    Ca    8.7      26 Mar 2021 06:42  Phos  3.7     03-  Mg     2.5     -    TPro  6.7  /  Alb  2.7<L>  /  TBili  0.4  /  DBili  .14  /  AST  86<H>  /  ALT  57  /  AlkPhos  48  03-26              PT/INR - ( 26 Mar 2021 06:42 )   PT: 16.0 sec;   INR: 1.40 ratio         PTT - ( 25 Mar 2021 16:01 )  PTT:25.8 sec  Urinalysis Basic - ( 25 Mar 2021 23:29 )    Color: Yellow / Appearance: Clear / S.010 / pH: x  Gluc: x / Ketone: Negative  / Bili: Negative / Urobili: Negative mg/dL   Blood: x / Protein: 100 mg/dL / Nitrite: Negative   Leuk Esterase: Trace / RBC: 0-2 /HPF / WBC 0-2   Sq Epi: x / Non Sq Epi: Few / Bacteria: Few        CARDIAC MARKERS ( 25 Mar 2021 19:18 )  .110 ng/mL / x     / x     / x     / x      CARDIAC MARKERS ( 25 Mar 2021 16:01 )  .085 ng/mL / x     / 104 U/L / x     / x             Interval Radiology studies: reviewed by me    < from: CT Angio Chest w/ IV Cont (21 @ 17:44) >  IMPRESSION:  Left upper lobe segmental pulmonary emboli..    Multifocal infiltrates in keeping with patient's history of COVID- 19 positive.    < end of copied text >    Tele reviewed by me showed no significant arrhythmias     MEDICATIONS  (STANDING):  dexAMETHasone  Injectable 6 milliGRAM(s) IV Push daily  enoxaparin Injectable 80 milliGRAM(s) SubCutaneous every 12 hours  remdesivir  IVPB   IV Intermittent   remdesivir  IVPB 100 milliGRAM(s) IV Intermittent every 24 hours    MEDICATIONS  (PRN):      
    INTERVAL HPI/OVERNIGHT EVENTS:  80y  Vital Signs Last 24 Hrs  T(C): 36.8 (31 Mar 2021 10:17), Max: 37.1 (31 Mar 2021 05:17)  T(F): 98.3 (31 Mar 2021 10:17), Max: 98.8 (31 Mar 2021 05:17)  HR: 70 (31 Mar 2021 10:17) (65 - 86)  BP: 104/71 (31 Mar 2021 10:17) (96/66 - 104/71)  BP(mean): --  RR: 220 (31 Mar 2021 10:17) (16 - 220)  SpO2: 97% (31 Mar 2021 10:17) (93% - 97%)  I&O's Summary    30 Mar 2021 07:01  -  31 Mar 2021 07:00  --------------------------------------------------------  IN: 355 mL / OUT: 1000 mL / NET: -645 mL    31 Mar 2021 07:01  -  31 Mar 2021 14:34  --------------------------------------------------------  IN: 400 mL / OUT: 900 mL / NET: -500 mL      MEDICATIONS  (STANDING):  apixaban 10 milliGRAM(s) Oral every 12 hours  bisacodyl 5 milliGRAM(s) Oral at bedtime  dexAMETHasone     Tablet 6 milliGRAM(s) Oral daily  furosemide    Tablet 20 milliGRAM(s) Oral daily  senna 2 Tablet(s) Oral at bedtime    MEDICATIONS  (PRN):  acetaminophen   Tablet .. 650 milliGRAM(s) Oral every 6 hours PRN Mild Pain (1 - 3)  zolpidem 5 milliGRAM(s) Oral at bedtime PRN Insomnia    LABS:    trop    03-31    x   |  x   |  x   ----------------------------<  x   x    |  x   |  0.61      TPro  6.0  /  Alb  2.3<L>  /  TBili  0.4  /  DBili  .13  /  AST  73<H>  /  ALT  71  /  AlkPhos  96  03-31    PT/INR - ( 31 Mar 2021 08:50 )   PT: 22.0 sec;   INR: 1.96 ratio             CAPILLARY BLOOD GLUCOSE              REVIEW OF SYSTEMS:  CONSTITUTIONAL: No fever, chills  RESPIRATORY: + cough, SOB  CARDIOVASCULAR: No chest pain, palpitations, dizziness, or leg swelling  GASTROINTESTINAL: No abdominal or epigastric pain. No nausea, vomiting, or hematemesis; No diarrhea or constipation. No melena or hematochezia.  GENITOURINARY: No dysuria, frequency, hematuria, or incontinence  NEUROLOGICAL: No headaches, loss of strength, numbness, or tremors      RADIOLOGY & ADDITIONAL TESTS:    Imaging Personally Reviewed:  [ ] YES  [ ] NO    Consultant(s) Notes Reviewed:  [x ] YES  [ ] NO    PHYSICAL EXAM:  GENERAL: elderly frail, on NC  NERVOUS SYSTEM:  Alert & Oriented, non focal  CHEST/LUNG: decreased BS bilaterally, no wheezing, crackles.  HEART: Regular rate and rhythm; No murmurs, rubs, or gallops  ABDOMEN: Soft, Nontender, Nondistended; Bowel sounds present  EXTREMITIES: + edema bilaterally        Care Discussed with Consultants/Other Providers [ x] YES  [ ] NO
    INTERVAL HPI/OVERNIGHT EVENTS:  80y  Vital Signs Last 24 Hrs  T(C): 36.8 (31 Mar 2021 10:17), Max: 37.1 (31 Mar 2021 05:17)  T(F): 98.3 (31 Mar 2021 10:17), Max: 98.8 (31 Mar 2021 05:17)  HR: 70 (31 Mar 2021 10:17) (65 - 86)  BP: 104/71 (31 Mar 2021 10:17) (96/66 - 104/71)  BP(mean): --  RR: 220 (31 Mar 2021 10:17) (16 - 220)  SpO2: 97% (31 Mar 2021 10:17) (93% - 97%)  I&O's Summary    30 Mar 2021 07:01  -  31 Mar 2021 07:00  --------------------------------------------------------  IN: 355 mL / OUT: 1000 mL / NET: -645 mL    31 Mar 2021 07:01  -  31 Mar 2021 14:34  --------------------------------------------------------  IN: 400 mL / OUT: 900 mL / NET: -500 mL      MEDICATIONS  (STANDING):  apixaban 10 milliGRAM(s) Oral every 12 hours  bisacodyl 5 milliGRAM(s) Oral at bedtime  dexAMETHasone     Tablet 6 milliGRAM(s) Oral daily  furosemide    Tablet 20 milliGRAM(s) Oral daily  senna 2 Tablet(s) Oral at bedtime    MEDICATIONS  (PRN):  acetaminophen   Tablet .. 650 milliGRAM(s) Oral every 6 hours PRN Mild Pain (1 - 3)  zolpidem 5 milliGRAM(s) Oral at bedtime PRN Insomnia    LABS:    trop    03-31    x   |  x   |  x   ----------------------------<  x   x    |  x   |  0.61      TPro  6.0  /  Alb  2.3<L>  /  TBili  0.4  /  DBili  .13  /  AST  73<H>  /  ALT  71  /  AlkPhos  96  03-31    PT/INR - ( 31 Mar 2021 08:50 )   PT: 22.0 sec;   INR: 1.96 ratio             CAPILLARY BLOOD GLUCOSE              REVIEW OF SYSTEMS:  CONSTITUTIONAL: No fever, chills  RESPIRATORY: + cough, SOB  CARDIOVASCULAR: No chest pain, palpitations, dizziness, or leg swelling  GASTROINTESTINAL: No abdominal or epigastric pain. No nausea, vomiting, or hematemesis; No diarrhea or constipation. No melena or hematochezia.  GENITOURINARY: No dysuria, frequency, hematuria, or incontinence  NEUROLOGICAL: No headaches, loss of strength, numbness, or tremors      RADIOLOGY & ADDITIONAL TESTS:    Imaging Personally Reviewed:  [ ] YES  [ ] NO    Consultant(s) Notes Reviewed:  [x ] YES  [ ] NO    PHYSICAL EXAM:  GENERAL: elderly frail, on NC  NERVOUS SYSTEM:  Alert & Oriented, non focal  CHEST/LUNG: decreased BS bilaterally, no wheezing, crackles.  HEART: Regular rate and rhythm; No murmurs, rubs, or gallops  ABDOMEN: Soft, Nontender, Nondistended; Bowel sounds present  EXTREMITIES: + edema bilaterally        Care Discussed with Consultants/Other Providers [ x] YES  [ ] NO
CHIEF COMPLAINT: + SOB and cough  was placed on high flow oxygen last evening.   no chest pain  no diarrhea  no dysuria  no active gross bleeding  + back pain.   Insomnia      PHYSICAL EXAM:    GENERAL: elderly pleasant and on high flow oxygen  CHEST/LUNG: decreased air entry bibasally, no wheezing, no crackles   HEART: Regular rate and rhythm; No murmurs, rubs  ABDOMEN: Soft, Nontender, Nondistended; Bowel sounds present  EXTREMITIES:  Moving all four extremities spontaneously, No clubbing, cyanosis. Chronic leg edema bilaterally.   NERVOUS SYSTEM:  Grossly non focal.  Psychiatry: AAO x 3, mood is appropriate       OBJECTIVE DATA:       Vital Signs Last 24 Hrs  T(C): 36.6 (28 Mar 2021 10:39), Max: 36.9 (27 Mar 2021 16:34)  T(F): 97.9 (28 Mar 2021 10:39), Max: 98.5 (27 Mar 2021 16:34)  HR: 73 (28 Mar 2021 10:39) (50 - 73)  BP: 102/70 (28 Mar 2021 10:39) (102/70 - 131/76)  BP(mean): --  RR: 17 (28 Mar 2021 10:39) (16 - 20)  SpO2: 96% (28 Mar 2021 10:39) (91% - 98%)           Daily     Daily Weight in k.3 (28 Mar 2021 05:00)  LABS:                x   |  x   |  x   ----------------------------<  x   x    |  x   |  0.69      TPro  6.3  /  Alb  2.4<L>  /  TBili  0.5  /  DBili  .13  /  AST  61<H>  /  ALT  77  /  AlkPhos  72                PT/INR - ( 28 Mar 2021 08:19 )   PT: 26.2 sec;   INR: 2.35 ratio               Culture - Urine (collected )  Source: .Urine Clean Catch (Midstream)  Final Report ():    >=3 organisms. Probable collection contamination.      MEDICATIONS  (STANDING):  apixaban 10 milliGRAM(s) Oral every 12 hours  dexAMETHasone  Injectable 6 milliGRAM(s) IV Push daily  furosemide    Tablet 20 milliGRAM(s) Oral daily  remdesivir  IVPB 100 milliGRAM(s) IV Intermittent every 24 hours  remdesivir  IVPB   IV Intermittent     MEDICATIONS  (PRN):  acetaminophen   Tablet .. 650 milliGRAM(s) Oral every 6 hours PRN Mild Pain (1 - 3)  zolpidem 5 milliGRAM(s) Oral at bedtime PRN Insomnia      
DENTON JURADO  MRN-[id number]    Follow Up:  COVID 19    Interval History: the pt is out of bed to chair, no distress, on NC, had an episode of O2 SAT decreasing to 88%, improved. The pt is afebrile, no leukocytosis, LFTs slightly elevated, Cr ok.      ROS:    [ ] Unobtainable because:  [ ] All other systems negative    Constitutional: no fever, no chills  Head: no trauma  Eyes: no vision changes, no eye pain  ENT:  no sore throat, no rhinorrhea  Cardiovascular:  no chest pain, no palpitation  Respiratory:  + SOB, + occasional cough  GI:  no abd pain, no vomiting, no diarrhea  urinary: no dysuria, no hematuria, no flank pain  musculoskeletal:  no joint pain, no joint swelling  skin:  no rash  neurology:  no headache, no seizure, no change in mental status  psych: no anxiety, no depression         Allergies  No Known Allergies        ANTIMICROBIALS:      OTHER MEDS:  acetaminophen   Tablet .. 650 milliGRAM(s) Oral every 6 hours PRN  apixaban 10 milliGRAM(s) Oral every 12 hours  bisacodyl 5 milliGRAM(s) Oral at bedtime  dexAMETHasone     Tablet 6 milliGRAM(s) Oral daily  furosemide    Tablet 20 milliGRAM(s) Oral daily  senna 2 Tablet(s) Oral at bedtime  zolpidem 5 milliGRAM(s) Oral at bedtime PRN      Vital Signs Last 24 Hrs  T(C): 36.8 (01 Apr 2021 10:37), Max: 36.9 (01 Apr 2021 05:23)  T(F): 98.3 (01 Apr 2021 10:37), Max: 98.4 (01 Apr 2021 05:23)  HR: 75 (01 Apr 2021 10:37) (68 - 75)  BP: 96/61 (01 Apr 2021 10:37) (96/61 - 127/75)  BP(mean): --  RR: 18 (01 Apr 2021 10:37) (17 - 20)  SpO2: 94% (01 Apr 2021 10:37) (88% - 97%)    Physical Exam:  General: nontoxic-appearing Female in no acute distress. on NC  HEENT: AT/NC. Anicteric. Conjunctiva pink and moist. Oropharynx clear.  Neck: Not rigid. No sense of mass.  Nodes: None palpable.  Lungs: Diminished breath sounds bilaterally without rales, wheezing or rhonchi  Heart: Regular rate and rhythm. No Murmur. No rub. No gallop. No palpable thrill.  Abdomen: Bowel sounds present and normoactive. Soft. Nondistended. Nontender. No sense of mass. No organomegaly.  Back: No spinal tenderness. No costovertebral angle tenderness.   Extremities: No cyanosis or clubbing. No edema.   Skin: Warm. Dry. Good turgor. No rash. No vasculitic stigmata.  Psychiatric: Appropriate affect and mood for situation.     WBC Count: 8.98 K/uL (03-26 @ 06:42)  WBC Count: 10.34 K/uL (03-25 @ 16:01)          04-01    x   |  x   |  x   ----------------------------<  x   x    |  x   |  0.74      TPro  6.4  /  Alb  2.5<L>  /  TBili  0.6  /  DBili  .14  /  AST  73<H>  /  ALT  89<H>  /  AlkPhos  86  04-01          Creatinine Trend: 0.74<--, 0.61<--, 0.65<--, 0.63<--, 0.69<--, 0.75<--      MICROBIOLOGY:  v  .Urine Clean Catch (Midstream)  03-26-21   >=3 organisms. Probable collection contamination.  --  --    C-Reactive Protein, Serum: 50 (03-29)  C-Reactive Protein, Serum: 179 (03-26)    Ferritin, Serum: 2950 (03-29)  Ferritin, Serum: 3644 (03-26)      D-Dimer Assay, Quantitative: 8652 (03-29)  D-Dimer Assay, Quantitative: 68744 (03-25)    Procalcitonin, Serum: 0.14 (03-26-21 @ 12:21)  Procalcitonin, Serum: 0.11 (03-26-21 @ 03:57)  Procalcitonin, Serum: 0.15 (03-26-21 @ 03:13)      RADIOLOGY:    
Patient is a 80y old  Female who presents with a chief complaint of worsening shortness of breath and fever with headache (02 Apr 2021 16:42)      INTERVAL HPI/OVERNIGHT EVENTS: no events     MEDICATIONS  (STANDING):  apixaban 5 milliGRAM(s) Oral two times a day  bisacodyl 5 milliGRAM(s) Oral at bedtime  furosemide    Tablet 20 milliGRAM(s) Oral daily  senna 2 Tablet(s) Oral at bedtime  sodium chloride 0.9% Bolus 500 milliLiter(s) IV Bolus once    MEDICATIONS  (PRN):  acetaminophen   Tablet .. 650 milliGRAM(s) Oral every 6 hours PRN Mild Pain (1 - 3)  zolpidem 5 milliGRAM(s) Oral at bedtime PRN Insomnia      Allergies    No Known Allergies    Intolerances       Vital Signs Last 24 Hrs  T(C): 36.8 (03 Apr 2021 11:06), Max: 36.8 (03 Apr 2021 11:06)  T(F): 98.2 (03 Apr 2021 11:06), Max: 98.2 (03 Apr 2021 11:06)  HR: 88 (03 Apr 2021 11:06) (64 - 88)  BP: 88/54 (03 Apr 2021 12:08) (88/54 - 119/69)  BP(mean): 68 (03 Apr 2021 11:06) (68 - 68)  RR: 17 (03 Apr 2021 11:06) (17 - 18)  SpO2: 100% (03 Apr 2021 11:06) (98% - 100%)    PHYSICAL EXAM:  GENERAL: NAD, well-groomed, well-developed  HEAD:  Atraumatic, Normocephalic  EYES: EOMI, PERRLA, conjunctiva and sclera clear  ENMT: No tonsillar erythema, exudates, or enlargement; Moist mucous membranes, Good dentition, No lesions  NECK: Supple, No JVD, Normal thyroid  NERVOUS SYSTEM:  Alert & Oriented X3, Good concentration; Motor Strength 5/5 B/L upper and lower extremities; DTRs 2+ intact and symmetric  CHEST/LUNG: Clear to percussion bilaterally; No rales, rhonchi, wheezing, or rubs  HEART: Regular rate and rhythm; No murmurs, rubs, or gallops  ABDOMEN: Soft, Nontender, Nondistended; Bowel sounds present  EXTREMITIES:  2+ Peripheral Pulses, No clubbing, cyanosis   LYMPH: No lymphadenopathy noted  SKIN: No rashes or lesions    LABS:    04-03    x   |  x   |  x   ----------------------------<  x   x    |  x   |  0.60      TPro  5.8<L>  /  Alb  2.5<L>  /  TBili  0.3  /  DBili  .12  /  AST  85<H>  /  ALT  106<H>  /  AlkPhos  68  04-03    PT/INR - ( 03 Apr 2021 06:39 )   PT: 14.0 sec;   INR: 1.22 ratio             CAPILLARY BLOOD GLUCOSE          RADIOLOGY & ADDITIONAL TESTS:    Imaging Personally Reviewed:  [ X] YES  [ ] NO    Consultant(s) Notes Reviewed:  [ X] YES  [ ] NO    Care Discussed with Consultants/Other Providers [X ] YES  [ ] NO
    INTERVAL HPI/OVERNIGHT EVENTS:  80y  Vital Signs Last 24 Hrs  T(C): 36.8 (31 Mar 2021 10:17), Max: 37.1 (31 Mar 2021 05:17)  T(F): 98.3 (31 Mar 2021 10:17), Max: 98.8 (31 Mar 2021 05:17)  HR: 70 (31 Mar 2021 10:17) (65 - 86)  BP: 104/71 (31 Mar 2021 10:17) (96/66 - 104/71)  BP(mean): --  RR: 220 (31 Mar 2021 10:17) (16 - 220)  SpO2: 97% (31 Mar 2021 10:17) (93% - 97%)  I&O's Summary    30 Mar 2021 07:01  -  31 Mar 2021 07:00  --------------------------------------------------------  IN: 355 mL / OUT: 1000 mL / NET: -645 mL    31 Mar 2021 07:01  -  31 Mar 2021 14:34  --------------------------------------------------------  IN: 400 mL / OUT: 900 mL / NET: -500 mL      MEDICATIONS  (STANDING):  apixaban 10 milliGRAM(s) Oral every 12 hours  bisacodyl 5 milliGRAM(s) Oral at bedtime  dexAMETHasone     Tablet 6 milliGRAM(s) Oral daily  furosemide    Tablet 20 milliGRAM(s) Oral daily  senna 2 Tablet(s) Oral at bedtime    MEDICATIONS  (PRN):  acetaminophen   Tablet .. 650 milliGRAM(s) Oral every 6 hours PRN Mild Pain (1 - 3)  zolpidem 5 milliGRAM(s) Oral at bedtime PRN Insomnia    LABS:    trop    03-31    x   |  x   |  x   ----------------------------<  x   x    |  x   |  0.61      TPro  6.0  /  Alb  2.3<L>  /  TBili  0.4  /  DBili  .13  /  AST  73<H>  /  ALT  71  /  AlkPhos  96  03-31    PT/INR - ( 31 Mar 2021 08:50 )   PT: 22.0 sec;   INR: 1.96 ratio             CAPILLARY BLOOD GLUCOSE              REVIEW OF SYSTEMS:  CONSTITUTIONAL: No fever, chills  RESPIRATORY: + cough, SOB  CARDIOVASCULAR: No chest pain, palpitations, dizziness, or leg swelling  GASTROINTESTINAL: No abdominal or epigastric pain. No nausea, vomiting, or hematemesis; No diarrhea or constipation. No melena or hematochezia.  GENITOURINARY: No dysuria, frequency, hematuria, or incontinence  NEUROLOGICAL: No headaches, loss of strength, numbness, or tremors      RADIOLOGY & ADDITIONAL TESTS:    Imaging Personally Reviewed:  [ ] YES  [ ] NO    Consultant(s) Notes Reviewed:  [x ] YES  [ ] NO    PHYSICAL EXAM:  GENERAL: elderly frail, on NC  NERVOUS SYSTEM:  Alert & Oriented, non focal  CHEST/LUNG: decreased BS bilaterally, no wheezing, crackles.  HEART: Regular rate and rhythm; No murmurs, rubs, or gallops  ABDOMEN: Soft, Nontender, Nondistended; Bowel sounds present  EXTREMITIES: + edema bilaterally        Care Discussed with Consultants/Other Providers [ x] YES  [ ] NO
CHIEF COMPLAINT: + SOB and cough   on 6 liter NC oyxgnen  no chest pain  no diarrhea  no dysuria  no active gross bleeding  + back pain.   Insomnia      PHYSICAL EXAM:    GENERAL: elderly pleasant and on NC oxygen  CHEST/LUNG: decreased air entry bibasally, no wheezing, no crackles   HEART: Regular rate and rhythm; No murmurs, rubs  ABDOMEN: Soft, Nontender, Nondistended; Bowel sounds present  EXTREMITIES:  Moving all four extremities spontaneously, No clubbing, cyanosis. Chronic leg edema bilaterally.   NERVOUS SYSTEM:  Grossly non focal.  Psychiatry: AAO x 3, mood is appropriate       OBJECTIVE DATA:     Vital Signs Last 24 Hrs  T(C): 36.7 (27 Mar 2021 10:43), Max: 36.8 (27 Mar 2021 05:49)  T(F): 98 (27 Mar 2021 10:43), Max: 98.2 (27 Mar 2021 05:49)  HR: 67 (27 Mar 2021 10:43) (52 - 67)  BP: 108/63 (27 Mar 2021 10:43) (108/63 - 129/71)  BP(mean): 86 (26 Mar 2021 15:57) (86 - 86)  RR: 18 (27 Mar 2021 10:43) (18 - 20)  SpO2: 94% (27 Mar 2021 10:43) (92% - 100%)           Daily     Daily   LABS:                        10.9   8.98  )-----------( 209      ( 26 Mar 2021 06:42 )             34.8             03-27    x   |  x   |  x   ----------------------------<  x   x    |  x   |  0.75    Ca    8.7      26 Mar 2021 06:42  Phos  3.7     03-  Mg     2.5     03-    TPro  6.7  /  Alb  2.6<L>  /  TBili  0.5  /  DBili  .14  /  AST  114<H>  /  ALT  104<H>  /  AlkPhos  77  03-27              PT/INR - ( 27 Mar 2021 08:46 )   PT: 15.7 sec;   INR: 1.37 ratio         PTT - ( 25 Mar 2021 16:01 )  PTT:25.8 sec  Urinalysis Basic - ( 25 Mar 2021 23:29 )    Color: Yellow / Appearance: Clear / S.010 / pH: x  Gluc: x / Ketone: Negative  / Bili: Negative / Urobili: Negative mg/dL   Blood: x / Protein: 100 mg/dL / Nitrite: Negative   Leuk Esterase: Trace / RBC: 0-2 /HPF / WBC 0-2   Sq Epi: x / Non Sq Epi: Few / Bacteria: Few       CARDIAC MARKERS ( 25 Mar 2021 19:18 )  .110 ng/mL / x     / x     / x     / x      CARDIAC MARKERS ( 25 Mar 2021 16:01 )  .085 ng/mL / x     / 104 U/L / x     / x            Culture - Urine (collected )  Source: .Urine Clean Catch (Midstream)  Final Report ():    >=3 organisms. Probable collection contamination.      MEDICATIONS  (STANDING):  apixaban 10 milliGRAM(s) Oral once  dexAMETHasone  Injectable 6 milliGRAM(s) IV Push daily  furosemide    Tablet 20 milliGRAM(s) Oral daily  remdesivir  IVPB 100 milliGRAM(s) IV Intermittent every 24 hours  remdesivir  IVPB   IV Intermittent     MEDICATIONS  (PRN):  zolpidem 5 milliGRAM(s) Oral at bedtime PRN Insomnia      
CHIEF COMPLAINT: Desat to mid 80's last night and placed on 6 liters. now on 4 liters  no chest pain  no diarrhea  no dysuria  no active gross bleeding  + back pain.   Insomnia      PHYSICAL EXAM:    GENERAL: elderly pleasant and on NC oxygen  CHEST/LUNG: decreased air entry bibasally, no wheezing, no crackles   HEART: Regular rate and rhythm; No murmurs, rubs  ABDOMEN: Soft, Nontender, Nondistended; Bowel sounds present  EXTREMITIES:  Moving all four extremities spontaneously, No clubbing, cyanosis. Chronic leg edema bilaterally.   NERVOUS SYSTEM:  Grossly non focal.  Psychiatry: AAO x 3, mood is appropriate       OBJECTIVE DATA:       Vital Signs Last 24 Hrs  T(C): 37.1 (29 Mar 2021 05:50), Max: 37.1 (29 Mar 2021 05:50)  T(F): 98.7 (29 Mar 2021 05:50), Max: 98.7 (29 Mar 2021 05:50)  HR: 83 (29 Mar 2021 05:50) (70 - 83)  BP: 108/70 (29 Mar 2021 05:50) (102/70 - 143/69)  BP(mean): 77 (28 Mar 2021 16:54) (77 - 77)  RR: 18 (29 Mar 2021 05:50) (17 - 18)  SpO2: 92% (29 Mar 2021 05:50) (92% - 96%)           Daily     Daily   LABS:            03-29    x   |  x   |  x   ----------------------------<  x   x    |  x   |  0.63      TPro  5.7<L>  /  Alb  2.2<L>  /  TBili  0.3  /  DBili  .13  /  AST  66<H>  /  ALT  72  /  AlkPhos  75  03-29              PT/INR - ( 29 Mar 2021 06:55 )   PT: 16.6 sec;   INR: 1.46 ratio             Culture - Urine (collected 03-26)  Source: .Urine Clean Catch (Midstream)  Final Report (03-27):    >=3 organisms. Probable collection contamination.        MEDICATIONS  (STANDING):  apixaban 10 milliGRAM(s) Oral every 12 hours  dexAMETHasone  Injectable 6 milliGRAM(s) IV Push daily  furosemide    Tablet 20 milliGRAM(s) Oral daily  remdesivir  IVPB 100 milliGRAM(s) IV Intermittent every 24 hours  remdesivir  IVPB   IV Intermittent     MEDICATIONS  (PRN):  acetaminophen   Tablet .. 650 milliGRAM(s) Oral every 6 hours PRN Mild Pain (1 - 3)  zolpidem 5 milliGRAM(s) Oral at bedtime PRN Insomnia      
DENTON JURADO  [id number]    Follow Up:  COVID 19    Interval History: The pt is out of bed to chair, no distress, in a good mood, on NC and tolerates it with O2 SAT of 100% during my exam, personally checked. The pt is afebrile, LFTs are elevated.       ROS:    [ ] Unobtainable because:  [ ] All other systems negative    Constitutional: no fever, no chills  Head: no trauma  Eyes: no vision changes, no eye pain  ENT:  no sore throat, no rhinorrhea  Cardiovascular:  no chest pain, no palpitation  Respiratory:  no SOB, no cough  GI:  no abd pain, no vomiting, no diarrhea  urinary: no dysuria, no hematuria, no flank pain  musculoskeletal:  no joint pain, no joint swelling  skin:  no rash  neurology:  no headache, no seizure, no change in mental status  psych: no anxiety, no depression         Allergies  No Known Allergies        ANTIMICROBIALS:      OTHER MEDS:  acetaminophen   Tablet .. 650 milliGRAM(s) Oral every 6 hours PRN  apixaban 5 milliGRAM(s) Oral two times a day  bisacodyl 5 milliGRAM(s) Oral at bedtime  dexAMETHasone     Tablet 6 milliGRAM(s) Oral daily  furosemide    Tablet 20 milliGRAM(s) Oral daily  senna 2 Tablet(s) Oral at bedtime  zolpidem 5 milliGRAM(s) Oral at bedtime PRN      Vital Signs Last 24 Hrs  T(C): 36.4 (02 Apr 2021 11:01), Max: 37 (02 Apr 2021 05:32)  T(F): 97.6 (02 Apr 2021 11:01), Max: 98.6 (02 Apr 2021 05:32)  HR: 72 (02 Apr 2021 12:10) (61 - 75)  BP: 101/60 (02 Apr 2021 12:10) (93/60 - 108/67)  BP(mean): --  RR: 18 (02 Apr 2021 12:10) (17 - 18)  SpO2: 98% (02 Apr 2021 12:10) (95% - 98%)    Physical Exam:  General: nontoxic-appearing Female in no acute distress. on NC  HEENT: AT/NC. Anicteric. Conjunctiva pink and moist. Oropharynx clear. Missing teeth   Neck: Not rigid. No sense of mass.  Nodes: None palpable.  Lungs: Diminished breath sounds bilaterally without rales, wheezing or rhonchi  Heart: Regular rate and rhythm. No Murmur. No rub. No gallop. No palpable thrill.  Abdomen: Bowel sounds present and normoactive. Soft. Nondistended. Nontender. No sense of mass. No organomegaly.  Back: No spinal tenderness. No costovertebral angle tenderness.   Extremities: No cyanosis or clubbing. No edema.   Skin: Warm. Dry. Good turgor. No rash. No vasculitic stigmata.  Psychiatric: Appropriate affect and mood for situation.       04-02    x   |  x   |  x   ----------------------------<  x   x    |  x   |  0.63      TPro  6.2  /  Alb  2.3<L>  /  TBili  0.5  /  DBili  .16  /  AST  78<H>  /  ALT  95<H>  /  AlkPhos  66  04-02          Creatinine Trend: 0.63<--, 0.74<--, 0.61<--, 0.65<--, 0.63<--, 0.69<--      MICROBIOLOGY:  v  .Urine Clean Catch (Midstream)  03-26-21   >=3 organisms. Probable collection contamination.  --  --      C-Reactive Protein, Serum: 50 (03-29)  C-Reactive Protein, Serum: 179 (03-26)    Ferritin, Serum: 2950 (03-29)  Ferritin, Serum: 3644 (03-26)      D-Dimer Assay, Quantitative: 8652 (03-29)  D-Dimer Assay, Quantitative: 95128 (03-25)        RADIOLOGY:    
DENTON JURADO  MRN-[id number]    Follow Up:  COVID 19    Interval History: the pt was seen and examined, out of bed to chair, on 4L supplemental O2 via NC, comfortable, complains of not getting a good night sleep. The pt is afebrile, no new cbc, somewhat hypotensive, Decadron changed to oral earlier.       ROS:    [ ] Unobtainable because:  [ ] All other systems negative    Constitutional: no fever, no chills  Head: no trauma  Eyes: no vision changes, no eye pain  ENT:  no sore throat, no rhinorrhea  Cardiovascular:  no chest pain, no palpitation  Respiratory:  no SOB with supplemental O2, occasional cough  GI:  no abd pain, no vomiting, no diarrhea  urinary: no dysuria, no hematuria, no flank pain  musculoskeletal:  no joint pain, no joint swelling  skin:  no rash  neurology:  no headache, no seizure, no change in mental status  psych: no anxiety, no depression         Allergies  No Known Allergies        ANTIMICROBIALS:      OTHER MEDS:  acetaminophen   Tablet .. 650 milliGRAM(s) Oral every 6 hours PRN  apixaban 10 milliGRAM(s) Oral every 12 hours  bisacodyl 5 milliGRAM(s) Oral at bedtime  dexAMETHasone     Tablet 6 milliGRAM(s) Oral daily  furosemide    Tablet 20 milliGRAM(s) Oral daily  senna 2 Tablet(s) Oral at bedtime  zolpidem 5 milliGRAM(s) Oral at bedtime PRN      Vital Signs Last 24 Hrs  T(C): 36.8 (31 Mar 2021 15:50), Max: 37.1 (31 Mar 2021 05:17)  T(F): 98.2 (31 Mar 2021 15:50), Max: 98.8 (31 Mar 2021 05:17)  HR: 75 (31 Mar 2021 15:50) (65 - 85)  BP: 127/75 (31 Mar 2021 15:50) (98/65 - 127/75)  BP(mean): --  RR: 20 (31 Mar 2021 15:50) (18 - 220)  SpO2: 97% (31 Mar 2021 15:50) (93% - 97%)    Physical Exam:  General: nontoxic-appearing Female in no acute distress. on NC  HEENT: AT/NC. Anicteric. Conjunctiva pink and moist. Oropharynx clear.  Neck: Not rigid. No sense of mass.  Nodes: None palpable.  Lungs: Diminished breath sounds bilaterally without rales, wheezing or rhonchi  Heart: Regular rate and rhythm. No Murmur. No rub. No gallop. No palpable thrill.  Abdomen: Bowel sounds present and normoactive. Soft. Nondistended. Nontender. No sense of mass. No organomegaly.  Back: No spinal tenderness. No costovertebral angle tenderness.   Extremities: No cyanosis or clubbing. No edema.   Skin: Warm. Dry. Good turgor. No rash. No vasculitic stigmata.  Psychiatric: Appropriate affect and mood for situation.     WBC Count: 8.98 K/uL (03-26 @ 06:42)  WBC Count: 10.34 K/uL (03-25 @ 16:01)          03-31    x   |  x   |  x   ----------------------------<  x   x    |  x   |  0.61      TPro  6.0  /  Alb  2.3<L>  /  TBili  0.4  /  DBili  .13  /  AST  73<H>  /  ALT  71  /  AlkPhos  96  03-31          Creatinine Trend: 0.61<--, 0.65<--, 0.63<--, 0.69<--, 0.75<--, 0.66<--      MICROBIOLOGY:  v  .Urine Clean Catch (Midstream)  03-26-21   >=3 organisms. Probable collection contamination.  --  --    C-Reactive Protein, Serum: 50 (03-29)  C-Reactive Protein, Serum: 179 (03-26)    Ferritin, Serum: 2950 (03-29)  Ferritin, Serum: 3644 (03-26)      D-Dimer Assay, Quantitative: 8652 (03-29)  D-Dimer Assay, Quantitative: 22413 (03-25)    Procalcitonin, Serum: 0.14 (03-26-21 @ 12:21)  Procalcitonin, Serum: 0.11 (03-26-21 @ 03:57)  Procalcitonin, Serum: 0.15 (03-26-21 @ 03:13)      RADIOLOGY:    
DENTON JURADO  MRN-[id number]    Follow Up:  COVID 19 pneumonia with hypoxia    Interval History: Pt is sitting on her bed, no distress, doing better, on RA now and comfortable, no fevers, still having episodes of hypotension, planning to go to rehab.     PAST MEDICAL & SURGICAL HISTORY:  Arthritis    Bilateral leg edema    Hypertension        ROS:    [ ] Unobtainable because:  [ ] All other systems negative    Constitutional: no fever, no chills  Head: no trauma  Eyes: no vision changes, no eye pain  ENT:  no sore throat, no rhinorrhea  Cardiovascular:  no chest pain, no palpitation  Respiratory:  no SOB, + cough  GI:  no abd pain, no vomiting, no diarrhea  urinary: no dysuria, no hematuria, no flank pain  musculoskeletal:  no joint pain, no joint swelling  skin:  no rash  neurology:  no headache, no seizure, no change in mental status  psych: no anxiety, no depression         Allergies  No Known Allergies        ANTIMICROBIALS:      OTHER MEDS:  acetaminophen   Tablet .. 650 milliGRAM(s) Oral every 6 hours PRN  apixaban 5 milliGRAM(s) Oral two times a day  bisacodyl 5 milliGRAM(s) Oral at bedtime  furosemide    Tablet 20 milliGRAM(s) Oral daily  midodrine. 5 milliGRAM(s) Oral three times a day  senna 2 Tablet(s) Oral at bedtime      Vital Signs Last 24 Hrs  T(C): 36.6 (05 Apr 2021 11:02), Max: 36.6 (04 Apr 2021 15:35)  T(F): 97.9 (05 Apr 2021 11:02), Max: 97.9 (04 Apr 2021 15:35)  HR: 87 (05 Apr 2021 11:02) (58 - 87)  BP: 94/58 (05 Apr 2021 11:02) (92/60 - 102/67)  BP(mean): --  RR: 18 (05 Apr 2021 11:08) (18 - 18)  SpO2: 95% (05 Apr 2021 11:08) (95% - 100%)    Physical Exam:  General: nontoxic-appearing Female in no acute distress. on RA  HEENT: AT/NC. Anicteric. Conjunctiva pink and moist. Oropharynx clear. Missing teeth   Neck: Not rigid. No sense of mass.  Nodes: None palpable.  Lungs: Diminished breath sounds bilaterally without rales, wheezing or rhonchi  Heart: Regular rate and rhythm. No Murmur. No rub. No gallop. No palpable thrill.  Abdomen: Bowel sounds present and normoactive. Soft. Nondistended. Nontender. No sense of mass. No organomegaly.  Back: No spinal tenderness. No costovertebral angle tenderness.   Extremities: No cyanosis or clubbing. No edema.   Skin: Warm. Dry. Good turgor. No rash. No vasculitic stigmata.  Psychiatric: Appropriate affect and mood for situation.     Creatinine Trend: 0.60<--, 0.63<--, 0.74<--, 0.61<--, 0.65<--, 0.63<--      MICROBIOLOGY:  v  .Urine Clean Catch (Midstream)  03-26-21   >=3 organisms. Probable collection contamination.  --  --          Rapid RVP Result: Detected (04-04 @ 19:29)        C-Reactive Protein, Serum: 50 (03-29)  C-Reactive Protein, Serum: 179 (03-26)    Ferritin, Serum: 2950 (03-29)  Ferritin, Serum: 3644 (03-26)      D-Dimer Assay, Quantitative: 8652 (03-29)  D-Dimer Assay, Quantitative: 46042 (03-25)      SARS-CoV-2: Detected (04 Apr 2021 19:29)    RADIOLOGY:    
DENTON JURADO  MRN-[id number]    Follow Up:  COVID 19, PE    Interval History: The pt was seen and examined, good mood, no distress, on NC 3L. O2 SAT mid 80s, increased O2 to 6L, O2 SAT increased to 91-92%, RN and hospitalist notified. The pt has no fevers, no new cbc, inflammatory markers are elevated, UCx from 3/26 contaminated, pt has no urinary complains.       ROS:    [ ] Unobtainable because:  [ ] All other systems negative    Constitutional: no fever, no chills  Head: no trauma  Eyes: no vision changes, no eye pain  ENT:  no sore throat, no rhinorrhea  Cardiovascular:  no chest pain, no palpitation  Respiratory:  + SOB, + dry cough  GI:  no abd pain, no vomiting, no diarrhea  urinary: no dysuria, no hematuria, no flank pain  musculoskeletal:  no joint pain, no joint swelling  skin:  no rash  neurology:  no headache, no seizure, no change in mental status  psych: no anxiety, no depression         Allergies  No Known Allergies        ANTIMICROBIALS:      OTHER MEDS:  acetaminophen   Tablet .. 650 milliGRAM(s) Oral every 6 hours PRN  apixaban 10 milliGRAM(s) Oral every 12 hours  furosemide    Tablet 20 milliGRAM(s) Oral daily  zolpidem 5 milliGRAM(s) Oral at bedtime PRN      Vital Signs Last 24 Hrs  T(C): 36.7 (30 Mar 2021 10:24), Max: 36.9 (29 Mar 2021 16:34)  T(F): 98 (30 Mar 2021 10:24), Max: 98.5 (29 Mar 2021 16:34)  HR: 87 (30 Mar 2021 10:24) (68 - 105)  BP: 97/56 (30 Mar 2021 10:24) (91/58 - 114/67)  BP(mean): --  RR: 18 (30 Mar 2021 12:05) (17 - 18)  SpO2: 93% (30 Mar 2021 12:05) (93% - 96%)    Physical Exam:  General: Frail but nontoxic-appearing Female in no acute distress. on NC  HEENT: AT/NC. Anicteric. Conjunctiva pink and moist. Oropharynx clear.  Neck: Not rigid. No sense of mass.  Nodes: None palpable.  Lungs: Diminished breath sounds bilaterally without rales, wheezing or rhonchi  Heart: Regular rate and rhythm. No Murmur. No rub. No gallop. No palpable thrill.  Abdomen: Bowel sounds present and normoactive. Soft. Nondistended. Nontender. No sense of mass. No organomegaly.  Back: No spinal tenderness. No costovertebral angle tenderness.   Extremities: No cyanosis or clubbing. No edema.   Skin: Warm. Dry. Good turgor. No rash. No vasculitic stigmata.  Psychiatric: Appropriate affect and mood for situation.     WBC Count: 8.98 K/uL (03-26 @ 06:42)  WBC Count: 10.34 K/uL (03-25 @ 16:01)          03-30    x   |  x   |  x   ----------------------------<  x   x    |  x   |  0.65      TPro  6.4  /  Alb  2.4<L>  /  TBili  0.4  /  DBili  .12  /  AST  52<H>  /  ALT  66  /  AlkPhos  75  03-30          Creatinine Trend: 0.65<--, 0.63<--, 0.69<--, 0.75<--, 0.66<--, 0.71<--      MICROBIOLOGY:  v  .Urine Clean Catch (Midstream)  03-26-21   >=3 organisms. Probable collection contamination.  --  --    C-Reactive Protein, Serum: 50 (03-29)  C-Reactive Protein, Serum: 179 (03-26)    Ferritin, Serum: 2950 (03-29)  Ferritin, Serum: 3644 (03-26)      D-Dimer Assay, Quantitative: 8652 (03-29)  D-Dimer Assay, Quantitative: 64916 (03-25)    Procalcitonin, Serum: 0.14 (03-26-21 @ 12:21)  Procalcitonin, Serum: 0.11 (03-26-21 @ 03:57)  Procalcitonin, Serum: 0.15 (03-26-21 @ 03:13)      RADIOLOGY:    
Subjective: Patient doing well with no overnight events.  Minimal symptoms.     MEDICATIONS  (STANDING):  apixaban 5 milliGRAM(s) Oral two times a day  bisacodyl 5 milliGRAM(s) Oral at bedtime  furosemide    Tablet 20 milliGRAM(s) Oral daily  midodrine. 5 milliGRAM(s) Oral three times a day  senna 2 Tablet(s) Oral at bedtime    MEDICATIONS  (PRN):  acetaminophen   Tablet .. 650 milliGRAM(s) Oral every 6 hours PRN Mild Pain (1 - 3)      Allergies    No Known Allergies    Intolerances        Vital Signs Last 24 Hrs  T(C): 36.6 (05 Apr 2021 11:02), Max: 36.6 (04 Apr 2021 15:35)  T(F): 97.9 (05 Apr 2021 11:02), Max: 97.9 (04 Apr 2021 15:35)  HR: 87 (05 Apr 2021 11:02) (58 - 87)  BP: 94/58 (05 Apr 2021 11:02) (91/57 - 102/67)  BP(mean): --  RR: 18 (05 Apr 2021 11:08) (18 - 18)  SpO2: 95% (05 Apr 2021 11:08) (95% - 100%)    PHYSICAL EXAM:  GENERAL: NAD, well-groomed, well-developed  HEAD:  Atraumatic, Normocephalic  ENMT: Moist mucous membranes,   NECK: Supple, No JVD, Normal thyroid  NERVOUS SYSTEM:  All 4 extremities mobile, no gross sensory deficits.   CHEST/LUNG: Coarse breath sounds   HEART: Regular rate and rhythm; No murmurs, rubs, or gallops  ABDOMEN: Soft, Nontender, Nondistended; Bowel sounds present  EXTREMITIES:  2+ Peripheral Pulses, No clubbing, cyanosis, or edema      LABS:              CAPILLARY BLOOD GLUCOSE          RADIOLOGY & ADDITIONAL TESTS:    Imaging Personally Reviewed:  [ ] YES     Consultant(s) Notes Reviewed:      Care Discussed with Consultants/Other Providers:    Advanced Directives: [ ] DNR  [ ] No feeding tube  [ ] MOLST in chart  [ ] MOLST completed today  [ ] Unknown  
Subjective: Patient seen and examined.  No complaints at this time.     MEDICATIONS  (STANDING):  apixaban 5 milliGRAM(s) Oral two times a day  bisacodyl 5 milliGRAM(s) Oral at bedtime  furosemide    Tablet 20 milliGRAM(s) Oral daily  midodrine. 5 milliGRAM(s) Oral three times a day  senna 2 Tablet(s) Oral at bedtime    MEDICATIONS  (PRN):  acetaminophen   Tablet .. 650 milliGRAM(s) Oral every 6 hours PRN Mild Pain (1 - 3)      Allergies    No Known Allergies    Intolerances        Vital Signs Last 24 Hrs  T(C): 36.7 (06 Apr 2021 05:41), Max: 36.9 (05 Apr 2021 16:12)  T(F): 98.1 (06 Apr 2021 05:41), Max: 98.4 (05 Apr 2021 16:12)  HR: 67 (06 Apr 2021 05:41) (67 - 87)  BP: 106/73 (06 Apr 2021 05:41) (92/60 - 112/76)  BP(mean): --  RR: 18 (06 Apr 2021 05:41) (18 - 18)  SpO2: 95% (06 Apr 2021 05:41) (95% - 99%)    PHYSICAL EXAM:  GENERAL: NAD, well-groomed, well-developed  HEAD:  Atraumatic, Normocephalic  ENMT: Moist mucous membranes,   NECK: Supple, No JVD, Normal thyroid  NERVOUS SYSTEM:  All 4 extremities mobile, no gross sensory deficits.   CHEST/LUNG: Clear to auscultation bilaterally; No rales, rhonchi, wheezing, or rubs  HEART: Regular rate and rhythm; No murmurs, rubs, or gallops  ABDOMEN: Soft, Nontender, Nondistended; Bowel sounds present  EXTREMITIES:  2+ Peripheral Pulses, No clubbing, cyanosis, or edema      LABS:                        10.3   10.79 )-----------( 237      ( 06 Apr 2021 08:51 )             33.3     06 Apr 2021 08:51    142    |  106    |  14     ----------------------------<  78     4.1     |  31     |  0.62     Ca    8.7        06 Apr 2021 08:51          CAPILLARY BLOOD GLUCOSE          RADIOLOGY & ADDITIONAL TESTS:    Imaging Personally Reviewed:  [ ] YES     Consultant(s) Notes Reviewed:      Care Discussed with Consultants/Other Providers:    Advanced Directives: [ ] DNR  [ ] No feeding tube  [ ] MOLST in chart  [ ] MOLST completed today  [ ] Unknown  
CHIEF COMPLAINT: now on 4 liters at rest.   reported improvement in breathing.   +fatigue   no chest pain  no diarrhea  no dysuria        PHYSICAL EXAM:    GENERAL: elderly pleasant and on NC oxygen  CHEST/LUNG: decreased air entry bibasally, no wheezing, no crackles   HEART: Regular rate and rhythm; No murmurs, rubs  ABDOMEN: Soft, Nontender, Nondistended; Bowel sounds present  EXTREMITIES:  Moving all four extremities spontaneously, No clubbing, cyanosis. Chronic leg edema bilaterally.   NERVOUS SYSTEM:  Grossly non focal.  Psychiatry: AAO x 3, mood is appropriate       OBJECTIVE DATA:     Vital Signs Last 24 Hrs  T(C): 36.7 (30 Mar 2021 10:24), Max: 37.2 (29 Mar 2021 10:53)  T(F): 98 (30 Mar 2021 10:24), Max: 99 (29 Mar 2021 10:53)  HR: 87 (30 Mar 2021 10:24) (68 - 105)  BP: 97/56 (30 Mar 2021 10:24) (91/58 - 114/67)  BP(mean): --  RR: 18 (30 Mar 2021 10:24) (18 - 18)  SpO2: 93% (30 Mar 2021 10:24) (92% - 96%)           Daily     Daily Weight in k.3 (30 Mar 2021 05:22)  LABS:                x   |  x   |  x   ----------------------------<  x   x    |  x   |  0.65      TPro  6.4  /  Alb  2.4<L>  /  TBili  0.4  /  DBili  .12  /  AST  52<H>  /  ALT  66  /  AlkPhos  75                PT/INR - ( 30 Mar 2021 07:48 )   PT: 26.5 sec;   INR: 2.38 ratio                  CAPILLARY BLOOD GLUCOSE          Culture - Urine (collected )  Source: .Urine Clean Catch (Midstream)  Final Report ():    >=3 organisms. Probable collection contamination.    MEDICATIONS  (STANDING):  apixaban 10 milliGRAM(s) Oral every 12 hours  furosemide    Tablet 20 milliGRAM(s) Oral daily    MEDICATIONS  (PRN):  acetaminophen   Tablet .. 650 milliGRAM(s) Oral every 6 hours PRN Mild Pain (1 - 3)  zolpidem 5 milliGRAM(s) Oral at bedtime PRN Insomnia      
81 yo lady comfortable on low flow O2    Vital Signs Last 24 Hrs  T(C): 36.8 (03 Apr 2021 11:06), Max: 36.8 (03 Apr 2021 11:06)  T(F): 98.2 (03 Apr 2021 11:06), Max: 98.2 (03 Apr 2021 11:06)  HR: 88 (03 Apr 2021 11:06) (64 - 88)  BP: 88/54 (03 Apr 2021 12:08) (88/54 - 119/69)  BP(mean): 68 (03 Apr 2021 11:06) (68 - 68)  RR: 17 (03 Apr 2021 11:06) (17 - 18)  SpO2: 100% (03 Apr 2021 11:06) (98% - 100%)      MEDICATIONS  (STANDING):  apixaban 5 milliGRAM(s) Oral two times a day  bisacodyl 5 milliGRAM(s) Oral at bedtime  furosemide    Tablet 20 milliGRAM(s) Oral daily  senna 2 Tablet(s) Oral at bedtime  sodium chloride 0.9% Bolus 500 milliLiter(s) IV Bolus once    MEDICATIONS  (PRN):  acetaminophen   Tablet .. 650 milliGRAM(s) Oral every 6 hours PRN Mild Pain (1 - 3)  zolpidem 5 milliGRAM(s) Oral at bedtime PRN Insomnia  
81 yo lady is doing well. No complaints    Vital Signs Last 24 Hrs  T(C): 36.6 (04 Apr 2021 15:35), Max: 36.7 (03 Apr 2021 23:23)  T(F): 97.9 (04 Apr 2021 15:35), Max: 98.1 (04 Apr 2021 05:10)  HR: 80 (04 Apr 2021 15:35) (62 - 86)  BP: 96/56 (04 Apr 2021 15:35) (76/52 - 131/71)  BP(mean): --  RR: 18 (04 Apr 2021 15:35) (18 - 19)  SpO2: 96% (04 Apr 2021 15:35) (95% - 100%)    MEDICATIONS  (STANDING):  apixaban 5 milliGRAM(s) Oral two times a day  bisacodyl 5 milliGRAM(s) Oral at bedtime  furosemide    Tablet 20 milliGRAM(s) Oral daily  midodrine. 5 milliGRAM(s) Oral three times a day  senna 2 Tablet(s) Oral at bedtime    MEDICATIONS  (PRN):  acetaminophen   Tablet .. 650 milliGRAM(s) Oral every 6 hours PRN Mild Pain (1 - 3)  
79 yo lady feeling better. No complaints    Vital Signs Last 24 Hrs  T(C): 36.7 (02 Apr 2021 16:28), Max: 37 (02 Apr 2021 05:32)  T(F): 98 (02 Apr 2021 16:28), Max: 98.6 (02 Apr 2021 05:32)  HR: 70 (02 Apr 2021 16:28) (61 - 75)  BP: 101/62 (02 Apr 2021 16:28) (93/60 - 108/67)  BP(mean): --  RR: 18 (02 Apr 2021 16:28) (17 - 18)  SpO2: 100% (02 Apr 2021 16:28) (95% - 100%)    MEDICATIONS  (STANDING):  apixaban 5 milliGRAM(s) Oral two times a day  bisacodyl 5 milliGRAM(s) Oral at bedtime  dexAMETHasone     Tablet 6 milliGRAM(s) Oral daily  furosemide    Tablet 20 milliGRAM(s) Oral daily  senna 2 Tablet(s) Oral at bedtime    MEDICATIONS  (PRN):  acetaminophen   Tablet .. 650 milliGRAM(s) Oral every 6 hours PRN Mild Pain (1 - 3)  zolpidem 5 milliGRAM(s) Oral at bedtime PRN Insomnia

## 2021-04-06 NOTE — PROGRESS NOTE ADULT - PROVIDER SPECIALTY LIST ADULT
Hospitalist
Infectious Disease
Pulmonology
Hospitalist
Pulmonology
Hospitalist
Infectious Disease
Hospitalist
Hospitalist
Infectious Disease
Hospitalist
Pulmonology
Hospitalist

## 2021-04-06 NOTE — PROGRESS NOTE ADULT - NUTRITIONAL ASSESSMENT
This patient has been assessed with a concern for Malnutrition and has been determined to have a diagnosis/diagnoses of Moderate protein-calorie malnutrition.    This patient is being managed with:   Diet Regular-  Low Sodium  Liquid Protein Supplement     Qty per Day:  1  Entered: Apr 1 2021  1:02PM    
This patient has been assessed with a concern for Malnutrition and has been determined to have a diagnosis/diagnoses of Moderate protein-calorie malnutrition.    This patient is being managed with:   Diet Regular-  Low Sodium  Liquid Protein Supplement     Qty per Day:  1  Entered: Apr 1 2021  1:02PM    Diet Regular-  Entered: Mar 25 2021 11:26PM    The following pending diet order is being considered for treatment of Moderate protein-calorie malnutrition:null
This patient has been assessed with a concern for Malnutrition and has been determined to have a diagnosis/diagnoses of Moderate protein-calorie malnutrition.    This patient is being managed with:   Diet Regular-  Low Sodium  Liquid Protein Supplement     Qty per Day:  1  Entered: Apr 1 2021  1:02PM

## 2021-04-09 DIAGNOSIS — D64.9 ANEMIA, UNSPECIFIED: ICD-10-CM

## 2021-04-09 DIAGNOSIS — I95.9 HYPOTENSION, UNSPECIFIED: ICD-10-CM

## 2021-04-09 DIAGNOSIS — U07.1 COVID-19: ICD-10-CM

## 2021-04-09 DIAGNOSIS — E44.0 MODERATE PROTEIN-CALORIE MALNUTRITION: ICD-10-CM

## 2021-04-09 DIAGNOSIS — G47.00 INSOMNIA, UNSPECIFIED: ICD-10-CM

## 2021-04-09 DIAGNOSIS — J96.01 ACUTE RESPIRATORY FAILURE WITH HYPOXIA: ICD-10-CM

## 2021-04-09 DIAGNOSIS — R60.0 LOCALIZED EDEMA: ICD-10-CM

## 2021-04-09 DIAGNOSIS — I26.99 OTHER PULMONARY EMBOLISM WITHOUT ACUTE COR PULMONALE: ICD-10-CM

## 2021-04-09 DIAGNOSIS — J12.82 PNEUMONIA DUE TO CORONAVIRUS DISEASE 2019: ICD-10-CM

## 2021-04-09 DIAGNOSIS — I10 ESSENTIAL (PRIMARY) HYPERTENSION: ICD-10-CM

## 2022-04-16 NOTE — DIETITIAN INITIAL EVALUATION ADULT. - PHYSICAL ASSESSMENT DORSAL HAND
Claritin 10 mg oral tablet: 1 tab(s) orally once a day  clindamycin 150 mg oral capsule: 3 cap(s) orally every 8 hours   diphenhydramine/lidocaine/aluminum hydroxide/magnesium hydroxide/simethicone mucous membrane suspension: 10 milliliter(s) mucous membrane every 8 hours   ibuprofen 400 mg oral tablet: 1 tab(s) orally every 8 hours   lactobacillus acidophilus oral capsule: 1 tab(s) orally every 8 hours   Tylenol 500 mg oral tablet: 2 tab(s) orally every 6 hours, As Needed   mild

## 2022-12-29 NOTE — PROGRESS NOTE ADULT - ASSESSMENT
COVID-19 with pulmonary embolism  She does not merit tocilizumab at this point in time  Given rapid improvement in oxygenation, supect hypoxemia may have been more from PE than COVID-19  Patient is on AC    The clinical and experimental literature involving medications in SARS-CoV-2/COVID-19 evolves rapidly as we learn more about the virus.     A general COVID-19 severity of illness categorization (NIH.gov):  •Asymptomatic or Presymptomatic Infection: Individuals who test positive for SARS-CoV-2 by virologic testing using a molecular diagnostic (e.g., polymerase chain reaction) or antigen test, but have no symptoms.  •Mild Illness: Individuals who have any of the various signs and symptoms of COVID 19 (e.g., fever, cough, sore throat, malaise, headache, muscle pain) without shortness of breath, dyspnea, or abnormal chest imaging.  •Moderate Illness: Individuals who have evidence of lower respiratory disease by clinical assessment or imaging and a saturationof oxygen (SpO2) greater than or equal to 94% on room air.  •Severe Illness: Individuals who have respiratory frequency more than 30 breaths per minute, SpO2 less than 94% on room air,ratio of arterial partial pressure of oxygen to fraction of inspired oxygen (PaO2/FiO2) less than 300 mmHg, or lung infiltratesgreater than 50%.  •Critical Illness: Individuals who have respiratory failure, septic shock, and/or multiple organ dysfunction.    Patient's illness is characterized as severe    Prognostic factors associated with increased risk of mortality include age >50, Neutrophil:Lymphocyte ratio >5, Procalcitonin > 0.2, Ferritin >850, CRP >6, and D-Dimer >1000 (Jaylon et al, Lancet, Mach 2020). It should be said that the lab values in/of themselves are nonspecific in nature and can be abnormal for reasons other than SARS-CoV-2 infection. The likelihood of developing severe respiratory difficulty appears to increase in the second week after developing presenting symptoms.    Inflammatory markers are unfavorable    The use of doxycycline, hydroxychloroquine, azithromycin, ivermectin, or colchicine is not recommended.   The routine use of IL-1 and IL-6 inhibitors is not supported by presently available data. IL-6 inhibitors are potentially an option in very select circumstances.   The data regarding use of convalescent plasma is limited and outside of very limited circumstances insufficient to recommend its use.    Remdesivir has not consistently been shown to impact mortality. Thus far it has been shown to accomplish is decrease the length of hospitalization in patients with severe disease. In patient with moderate disease data showed clinical improvement in patient treated with 5 days of remdesivir, but not those treated for 10 days.    Criteria for use include:  • SpO2 < 94% on room air, OR requiring supplemental oxygen, OR requiring invasive mechanical ventilation, OR requiring ECMO (e.g moderate to critical disease)  • eGFR > 30 mL/min  • ALT < 5X ULN    Contraindications  • Use during pregnancy unless the potential benefits justify the potential risk for the mother and the fetus.  • Remdesivir should not be initiated in patients with ALT greater than or equal to 5 times the upper limit of normal (ULN) of baseline.  • Use in patients with renal impairment is based on potential risk/benefit considerations.  Remdesivir Dosing  • Adult patients greater than or equal to 40 k mG IV x 1 dose on day 1, followed by 100 mG IV q24h.  • Administration of Remdesivir in patients with eGFR less than 30 mL/min should be considered if the potential benefits outweigh the potential risks. There is a potential accumulation of cyclodextrin excipient found in Remdesivir.  Treatment Duration  • Mechanical ventilation and/or ECMO, duration is 10 days of treatment.  • Not requiring mechanical ventilation or ECMO, duration is 5 days of treatment.       If patient does not demonstrate clinical improvement, treatment may be extended for a total of 10 days if clinically indicated.  • Patients do not need to complete the course if they are stable for discharge.  Monitoring Parameters  • Daily renal and hepatic monitoring should be performed while on therapy       Discontinue therapy if patient is asymptomatic with ALT greater than or equal to 5 times the ULN, restart once ALT less than 5 times the ULN.       Discontinue therapy if ALT elevation is accompanied by signs or symptoms of liver inflammation or increasing conjugated bilirubin, alkaline phosphatase, or INR.  • Pregnancy Test  • Infusion-related reactions have been reported       Discontinue infusion and administer appropriate treatment.    In a large study, dexamethasone reduced  mortality reduced by 17% when adjusted for age/risk.    NIH recommendations on which patients should receive dexamethasone:  • In patients with severe COVID-19 who required oxygen support, the use of dexamethasone 6 mG daily for up to 10 days reduced mortality at 28 days in a preliminary analysis.  • The benefit of dexamethasone was most apparent in hospitalized patients who were mechanically ventilated. In other subgroup analysis the mortality reduction in ECMO or ventilator patients was 35% and in individuals requiring supplemental oxygen only mortality was decreased by 20%. However while those decreases are significant, it should be noted that mortality remained high in both groups (29% and 21.3%, respectively).   • There was no observed benefit of dexamethasone in patients who did not require oxygen support.  In subgroup analysis there was a trend toward higher mortality in patients who did not require oxygen or ventilatory support though it did not reach statistical signficance.     Monitoring, Adverse Effects, and Drug-Drug Interactions:  • Clinicians should closely monitor patients with COVID-19 who are receiving dexamethasone for adverse effects (e.g., hyperglycemia, secondary infections, psychiatric effects, avascular necrosis).  • Prolonged use of systemic corticosteroids may increase the risk of reactivation of latent infections (e.g., hepatitis B virus (HBV), herpesvirus infections, strongyloidiasis, tuberculosis).  • The risk of reactivation of latent infections for a 10-day course of dexamethasone (6 mG once daily) is not well-defined. When initiating dexamethasone, appropriate screening and treatment to reduce the risk of strongyloides superinfection in patients at high risk of strongyloidiasis (e.g. patients from tropical, subtropical, or warm, temperate regions or those engaged in agricultural activities) or fulminant reactivations of HBV should be considered.  • Dexamethasone should be continued for up to 10 days or until hospital discharge, whichever comes first.  Alternative medication:  • In case of dexamethasone shortage alternative medications may include methylprednisolone 32mG and prednisone 40mG.    3/30: no fevers, inflammatory markers are elevated, UCx contaminated but the pt has no complains of UTI, with normal renal and hepatic functions, s/p remdesivir, on decadron   3/31: remains afebrile, doing well on NC, somewhat hypotensive, spoke with pt's daughter via telephone and updated on current events as per pt's request   : no fevers, no new cbc, LFTs slightly elevated, Cr ok, s/p remdesivir, on decadron, had an episode of O2 SAT 88% on NC - improved to 94-96% on 4L.   : remains afebrile, no new cbc, LFTs elevated, Cr ok, on 4L NC with O2 % during my exam, on decadron, s/p remdesivir   4/5: no fevers, on RA, hypotensive occasionally, s/p decadron and remdesivir     Suggestions--  remdesivir- 5 day course complete   dexamethasone - 10 day course - complete   Precautions per protocol, ideally airborne and contact in negative pressure room  Supplemental oxygen as required to maintain adequate saturation.  Avoid NIPPV, high flow O2, nebulizers or other interventions which may increase the likelihood of aerosolization as much as feasible  High threshold for antibiotic use  Vigilance for secondary infection and other superimposed events  Monitor inflammatory markers and lab data  A/C per primary team.   COVID-19 with pulmonary embolism  She does not merit tocilizumab at this point in time  Given rapid improvement in oxygenation, supect hypoxemia may have been more from PE than COVID-19  Patient is on AC    The clinical and experimental literature involving medications in SARS-CoV-2/COVID-19 evolves rapidly as we learn more about the virus.     A general COVID-19 severity of illness categorization (NIH.gov):  •Asymptomatic or Presymptomatic Infection: Individuals who test positive for SARS-CoV-2 by virologic testing using a molecular diagnostic (e.g., polymerase chain reaction) or antigen test, but have no symptoms.  •Mild Illness: Individuals who have any of the various signs and symptoms of COVID 19 (e.g., fever, cough, sore throat, malaise, headache, muscle pain) without shortness of breath, dyspnea, or abnormal chest imaging.  •Moderate Illness: Individuals who have evidence of lower respiratory disease by clinical assessment or imaging and a saturationof oxygen (SpO2) greater than or equal to 94% on room air.  •Severe Illness: Individuals who have respiratory frequency more than 30 breaths per minute, SpO2 less than 94% on room air,ratio of arterial partial pressure of oxygen to fraction of inspired oxygen (PaO2/FiO2) less than 300 mmHg, or lung infiltratesgreater than 50%.  •Critical Illness: Individuals who have respiratory failure, septic shock, and/or multiple organ dysfunction.    Patient's illness is characterized as severe    Prognostic factors associated with increased risk of mortality include age >50, Neutrophil:Lymphocyte ratio >5, Procalcitonin > 0.2, Ferritin >850, CRP >6, and D-Dimer >1000 (Jaylon et al, Lancet, Mach 2020). It should be said that the lab values in/of themselves are nonspecific in nature and can be abnormal for reasons other than SARS-CoV-2 infection. The likelihood of developing severe respiratory difficulty appears to increase in the second week after developing presenting symptoms.    Inflammatory markers are unfavorable    The use of doxycycline, hydroxychloroquine, azithromycin, ivermectin, or colchicine is not recommended.   The routine use of IL-1 and IL-6 inhibitors is not supported by presently available data. IL-6 inhibitors are potentially an option in very select circumstances.   The data regarding use of convalescent plasma is limited and outside of very limited circumstances insufficient to recommend its use.    Remdesivir has not consistently been shown to impact mortality. Thus far it has been shown to accomplish is decrease the length of hospitalization in patients with severe disease. In patient with moderate disease data showed clinical improvement in patient treated with 5 days of remdesivir, but not those treated for 10 days.    Criteria for use include:  • SpO2 < 94% on room air, OR requiring supplemental oxygen, OR requiring invasive mechanical ventilation, OR requiring ECMO (e.g moderate to critical disease)  • eGFR > 30 mL/min  • ALT < 5X ULN    Contraindications  • Use during pregnancy unless the potential benefits justify the potential risk for the mother and the fetus.  • Remdesivir should not be initiated in patients with ALT greater than or equal to 5 times the upper limit of normal (ULN) of baseline.  • Use in patients with renal impairment is based on potential risk/benefit considerations.  Remdesivir Dosing  • Adult patients greater than or equal to 40 k mG IV x 1 dose on day 1, followed by 100 mG IV q24h.  • Administration of Remdesivir in patients with eGFR less than 30 mL/min should be considered if the potential benefits outweigh the potential risks. There is a potential accumulation of cyclodextrin excipient found in Remdesivir.  Treatment Duration  • Mechanical ventilation and/or ECMO, duration is 10 days of treatment.  • Not requiring mechanical ventilation or ECMO, duration is 5 days of treatment.       If patient does not demonstrate clinical improvement, treatment may be extended for a total of 10 days if clinically indicated.  • Patients do not need to complete the course if they are stable for discharge.  Monitoring Parameters  • Daily renal and hepatic monitoring should be performed while on therapy       Discontinue therapy if patient is asymptomatic with ALT greater than or equal to 5 times the ULN, restart once ALT less than 5 times the ULN.       Discontinue therapy if ALT elevation is accompanied by signs or symptoms of liver inflammation or increasing conjugated bilirubin, alkaline phosphatase, or INR.  • Pregnancy Test  • Infusion-related reactions have been reported       Discontinue infusion and administer appropriate treatment.    In a large study, dexamethasone reduced  mortality reduced by 17% when adjusted for age/risk.    NIH recommendations on which patients should receive dexamethasone:  • In patients with severe COVID-19 who required oxygen support, the use of dexamethasone 6 mG daily for up to 10 days reduced mortality at 28 days in a preliminary analysis.  • The benefit of dexamethasone was most apparent in hospitalized patients who were mechanically ventilated. In other subgroup analysis the mortality reduction in ECMO or ventilator patients was 35% and in individuals requiring supplemental oxygen only mortality was decreased by 20%. However while those decreases are significant, it should be noted that mortality remained high in both groups (29% and 21.3%, respectively).   • There was no observed benefit of dexamethasone in patients who did not require oxygen support.  In subgroup analysis there was a trend toward higher mortality in patients who did not require oxygen or ventilatory support though it did not reach statistical signficance.     Monitoring, Adverse Effects, and Drug-Drug Interactions:  • Clinicians should closely monitor patients with COVID-19 who are receiving dexamethasone for adverse effects (e.g., hyperglycemia, secondary infections, psychiatric effects, avascular necrosis).  • Prolonged use of systemic corticosteroids may increase the risk of reactivation of latent infections (e.g., hepatitis B virus (HBV), herpesvirus infections, strongyloidiasis, tuberculosis).  • The risk of reactivation of latent infections for a 10-day course of dexamethasone (6 mG once daily) is not well-defined. When initiating dexamethasone, appropriate screening and treatment to reduce the risk of strongyloides superinfection in patients at high risk of strongyloidiasis (e.g. patients from tropical, subtropical, or warm, temperate regions or those engaged in agricultural activities) or fulminant reactivations of HBV should be considered.  • Dexamethasone should be continued for up to 10 days or until hospital discharge, whichever comes first.  Alternative medication:  • In case of dexamethasone shortage alternative medications may include methylprednisolone 32mG and prednisone 40mG.    3/30: no fevers, inflammatory markers are elevated, UCx contaminated but the pt has no complains of UTI, with normal renal and hepatic functions, s/p remdesivir, on decadron   3/31: remains afebrile, doing well on NC, somewhat hypotensive, spoke with pt's daughter via telephone and updated on current events as per pt's request   : no fevers, no new cbc, LFTs slightly elevated, Cr ok, s/p remdesivir, on decadron, had an episode of O2 SAT 88% on NC - improved to 94-96% on 4L.   : remains afebrile, no new cbc, LFTs elevated, Cr ok, on 4L NC with O2 % during my exam, on decadron, s/p remdesivir   4/5: no fevers, on RA, hypotensive occasionally, s/p decadron and remdesivir     Suggestions--  remdesivir- 5 day course complete   dexamethasone - 10 day course - complete   Precautions per protocol, ideally airborne and contact in negative pressure room  Supplemental oxygen as required to maintain adequate saturation.  Avoid NIPPV, high flow O2, nebulizers or other interventions which may increase the likelihood of aerosolization as much as feasible  High threshold for antibiotic use  Vigilance for secondary infection and other superimposed events  Monitor inflammatory markers and lab data  A/C per primary team.  Will sign off, re-consult as needed    denies pain/discomfort

## 2023-08-30 NOTE — PHYSICAL THERAPY INITIAL EVALUATION ADULT - GAIT DEVIATIONS NOTED, PT EVAL
decreased anshul/decreased step length/decreased stride length/decreased weight-shifting ability pt with right leg swelling, IV line and ABX given as ordered, no distress, no pain safety maintained

## 2023-10-13 ENCOUNTER — APPOINTMENT (OUTPATIENT)
Dept: ORTHOPEDIC SURGERY | Facility: CLINIC | Age: 83
End: 2023-10-13
Payer: MEDICAID

## 2023-10-13 VITALS — HEIGHT: 59 IN

## 2023-10-13 DIAGNOSIS — I10 ESSENTIAL (PRIMARY) HYPERTENSION: ICD-10-CM

## 2023-10-13 DIAGNOSIS — Z78.9 OTHER SPECIFIED HEALTH STATUS: ICD-10-CM

## 2023-10-13 DIAGNOSIS — M17.0 BILATERAL PRIMARY OSTEOARTHRITIS OF KNEE: ICD-10-CM

## 2023-10-13 DIAGNOSIS — M25.562 PAIN IN RIGHT KNEE: ICD-10-CM

## 2023-10-13 DIAGNOSIS — M25.561 PAIN IN RIGHT KNEE: ICD-10-CM

## 2023-10-13 PROBLEM — M19.90 UNSPECIFIED OSTEOARTHRITIS, UNSPECIFIED SITE: Chronic | Status: ACTIVE | Noted: 2021-03-25

## 2023-10-13 PROBLEM — Z00.00 ENCOUNTER FOR PREVENTIVE HEALTH EXAMINATION: Status: ACTIVE | Noted: 2023-10-13

## 2023-10-13 PROBLEM — R60.0 LOCALIZED EDEMA: Chronic | Status: ACTIVE | Noted: 2021-03-25

## 2023-10-13 PROCEDURE — 20610 DRAIN/INJ JOINT/BURSA W/O US: CPT | Mod: 50

## 2023-10-13 PROCEDURE — 73564 X-RAY EXAM KNEE 4 OR MORE: CPT | Mod: 50

## 2023-10-13 PROCEDURE — 99204 OFFICE O/P NEW MOD 45 MIN: CPT | Mod: 25

## 2023-10-13 RX ORDER — AMLODIPINE BESYLATE 5 MG/1
TABLET ORAL
Refills: 0 | Status: ACTIVE | COMMUNITY

## 2023-10-13 RX ORDER — DICLOFENAC SODIUM 10 MG/G
1 GEL TOPICAL
Refills: 0 | Status: ACTIVE | COMMUNITY

## 2025-06-18 ENCOUNTER — APPOINTMENT (OUTPATIENT)
Dept: ORTHOPEDIC SURGERY | Facility: CLINIC | Age: 85
End: 2025-06-18
Payer: MEDICAID

## 2025-06-18 VITALS — BODY MASS INDEX: 27.21 KG/M2 | WEIGHT: 135 LBS | HEIGHT: 59 IN

## 2025-06-18 PROCEDURE — 73562 X-RAY EXAM OF KNEE 3: CPT | Mod: 50

## 2025-06-18 PROCEDURE — 99214 OFFICE O/P EST MOD 30 MIN: CPT

## 2025-06-18 RX ORDER — DICLOFENAC SODIUM 10 MG/G
1 GEL TOPICAL DAILY
Qty: 1 | Refills: 3 | Status: ACTIVE | COMMUNITY
Start: 2025-06-18 | End: 1900-01-01

## 2025-06-25 ENCOUNTER — APPOINTMENT (OUTPATIENT)
Dept: ORTHOPEDIC SURGERY | Facility: CLINIC | Age: 85
End: 2025-06-25